# Patient Record
Sex: FEMALE | Race: ASIAN | URBAN - METROPOLITAN AREA
[De-identification: names, ages, dates, MRNs, and addresses within clinical notes are randomized per-mention and may not be internally consistent; named-entity substitution may affect disease eponyms.]

---

## 2023-02-06 ENCOUNTER — HOSPITAL ENCOUNTER (OUTPATIENT)
Dept: PHYSICAL THERAPY | Age: 63
Setting detail: THERAPIES SERIES
Discharge: HOME | End: 2023-02-06
Payer: COMMERCIAL

## 2023-02-06 PROCEDURE — 97530 THERAPEUTIC ACTIVITIES: CPT

## 2023-02-06 PROCEDURE — 97162 PT EVAL MOD COMPLEX 30 MIN: CPT

## 2023-02-06 NOTE — FLOWSHEET NOTE
Jose Antonio  78. and Therapy, [facility], [address], [address]  Phone: [phone]  Fax [phone]     Physical Therapy: Daily Note   Patient: Eron Goldman (51 y.o. female)   Treatment Date: 2023   :  1960 MRN: [id number]   Visit #: 1   Auth needed? []  Yes    [x]  No   Amount authorized:   Visit Limit:  Insurance: Payor: Bacilio Ernandez / Plan: [facility] / Product Type: *No Product type* /   Insurance ID: [id number] - (Commercial)  Secondary Insurance (if applicable):    Treatment Diagnosis:    No diagnosis found. Medical Diagnosis: Sciatic nerve pain [M54.30]     Referring Physician: Tato Marr MD    PCP: No primary care provider on file. Plan of Care signed? []  Yes [x]  No  Latex Allergy? [] Y   [] N      Pacemaker? [] Y   [] N   Preferred Language for Healthcare:   [x] English       [] other:       RESTRICTIONS/PRECAUTIONS:   osteoporosis   recent surgical history (relative)    Functional Outcome Measure/Scale:    Date Assessed (Eval)     Modified Oswestry  (%) 44% disability          SUBJECTIVE EXAMINATION   Pain level:  1-2/10    Patient Report/Comments: Pt presents with c/o sciatic nerve pain for 5+ years, Off/on.  was 'so bad' that she had surgery 10/20/21 to 'clean out for the nerve'. Felt good for a short while after surgery but pain has returned and worsened. Morning is the least pain. Pain is worse during the day and worsens as the day goes. Pt able to exercise in the morning without pain. Hot yoga has 'helped a lot'. Pain is in the L buttock into the L posterior thigh almost to the knee. Pain is sharp. Standing is the most painful. Sitting can improve the pain but not get rid of it. Sleeping will 'get rid of the pain' but difficulty falling asleep due to pain. Pt notes long Hx of L knee pain with up/down steps. No limitations with sitting.   Standing limited to 5 mins due to pain. Walking limited to 5 mins when the pain is aggravated. In the morning walking 6 miles without issues. Sleeping through the night. Pt not currently taking medication for the pain. Pt had a nerve block 2 weeks ago but the pain did not improve. OBJECTIVE EXAMINATION   Observation:     Test measurements:     Standing:  elevated L ilium, R pelvic rotation. R toeing out with increased pes planus. Increased R WB, slight L knee flexion. L knee valgus and pes planus. Decreased gluteal and core activation with standing posture, sway back with forward pelvis. B scapular IR, AT, R worse than L. Palpation: tenderness and fullness in L QL     Gait/Steps/Balance    []   [facility]        [x]    Dysfunction Noted. Comment: Decreased stride length and push off, femoral amb, antalgic gait with waddle gait. ERS lumbar spine. Hip IR and knee valgus, L worse than R. Decreased stance time on L.                []  All balance WFL unless otherwise noted below:  Single limb stance: L with forward pelvis, hip IR and trendelenberg. SLS squat:  B hip IR, knee valgus, trendelenberg, all worse on L SLS  Squat: Quad dominant, increased R WB     Joint Mobility  Lumbar: Hypomobility L SI  Hip: Hypomobility L hip joint         TREATMENT     Exercise / Equipment / Intervention Sets / Reps / Resistance Comments / Cueing HEP         TA sets  NV     Bridging NV      Clamshells NV                                                      TG  NV                          Manual Treatment: NV B LE LAD, LE pulling. B hip IR/ER neuro re-ed resisted training. Sciatic tracing if necessary. S3 mobs gr 4 through 3 breaths. Modalities:    [x] None  [] Electrical Stimulation: for pain modulation and symptom control  [] Other:     ASSESSMENT     Pt is a 59 y/o presenting to physical therapy with c/o L hip pain. Movement impairment L AGMR and ERS lumbar spine. Weakness B gluteals.      Treatment/Activity Tolerance:  [x] Patient tolerated treatment well [] Patient limited by fatigue  [] Patient limited by pain  [] Patient limited by other medical complications  [] Other:     Overall Progression Towards Functional goals/ Treatment Progress Update:  [] Patient is progressing as expected towards functional goals listed. [] Progression is slowed due to complexities/Impairments listed. [] Progression has been slowed due to co-morbidities. [x] Plan just implemented, too soon (<30days) to assess goals progression   [] Goals require adjustment due to lack of progress  [] Patient is not progressing as expected and requires additional follow up with physician  [] Other:     Prognosis for POC:   [x] Good     [] Fair     [] Poor      Patient requires continued skilled intervention: [x] Yes       [] No    GOALS     Short term goal 1: Pt will be indep in HEP  Short term goal 2: Pt will decrease pain 25%  Short term goal 3: Pt will increase B hip strength to 4/5  Short term goal 4: Pt will return to PLOF  Short term goal 5: Pt will return to up and down steps without limits to pain with reciprocal gait     TREATMENT PLAN     [] Continue per plan of care [] Alter current plan  [x] Plan of care initiated [] Hold pending MD visit  [] Discharge    BILLING     Timed Code Treatment Minutes: 15   Total Treatment Minutes: 45       CPT Code # CPT Code #     [] Eval Low (370 1011)    [] Gait [phone])     [x] Eval Medium (88233) 1  [] VASO (37344)     [] Eval High (21851)   [] Estim Unattended (51163)     [] Re-Eval (59611)   [] Mech. Traction (21639)     [] Therex (64630)    [] Dry Needle 1-2 muscle (71378)     [] Neuromusc. Re-ed (10980)   [] Dry Needle 3+ muscle (57550)     [] Manual (66016)   [] Group Therapy     [x] Ther.  Act (79598) 1  []       Electronically Signed by Portia Cortez, PT  Date: 02/06/2023

## 2023-02-06 NOTE — THERAPY EVALUATION
Jose Antonio  78. and Therapy, [place] [address] [address]  [address] [facility], [address] [place] [address] Dr  Phone: [phone]  Fax [phone]    Dear Referring Practitioner: Dr. Nkechi Sheppard,     We had the pleasure of evaluating the following patient for physical therapy services at [facility]. A summary of our findings can be found in the initial assessment below. This includes our plan of care. If you have any questions or concerns regarding these findings, please do not hesitate to contact me at the office phone number. Thank you for the referral.         Physician Signature:_______________________________Date:__________________  By signing above (or electronic signature), therapists plan is approved by physician        LOWER EXTREMITY PHYSICAL THERAPY EVALUATION      Evaluation Date: 2/6/2023    Patient Name: Thania Levine   YOB: 1960    Medical Diagnosis:  Sciatic nerve pain [M54.30]  Treatment Diagnosis:  Sciatic Pain  Onset Date:  Chronic    Referral Date: 2/6/2023   Referring Provider: Jelani Hendrickson MD   Insurance Provider: [facility]  Restrictions/Precautions:    osteoporosis   recent surgical history (relative)    SUBJECTIVE FINDINGS    History of Present Illness:  Pt presents with c/o sciatic nerve pain for 5+ years, Off/on. 2021 was 'so bad' that she had surgery 10/20/21 to 'clean out for the nerve'. Felt good for a short while after surgery but pain has returned and worsened. Morning is the least pain. Pain is worse during the day and worsens as the day goes. Pt able to exercise in the morning without pain. Hot yoga has 'helped a lot'. Pain is in the L buttock into the L posterior thigh almost to the knee. Pain is sharp. Standing is the most painful. Sitting can improve the pain but not get rid of it. Sleeping will 'get rid of the pain' but difficulty falling asleep due to pain. Pt notes long Hx of L knee pain with up/down steps. No limitations with sitting. Standing limited to 5 mins due to pain. Walking limited to 5 mins when the pain is aggravated. In the morning walking 6 miles without issues. Sleeping through the night. Pt not currently taking medication for the pain. Pt had a nerve block 2 weeks ago but the pain did not improve. Medical History: See above   Current Functional Limitations: See above    PLOF: Full functional activity without limits to pain. Currently, 50% PLOF    Red Flags:  none    Pain       Patient describes pain to be intermittent   Patient reports 1-2/10 pain at present and  6/10 pain at its worst.  Worsened by standing, walking if flared up  Improved by rest, sleep  Pt. reports pain with coughing, sneezing and laughing:   []Yes   [x]No   []NA   Pt. reports bowel and bladder changes (incontinence, retention):   []Yes   [x]No   []NA   Pt. reports saddle paresthesia? []Yes   [x]No   []NA   Pt. reports that the knee/hip/ankle gives out, locks, pops, grinds     []Yes [x]   No    Pt's sleep is affected? []   Yes [x]   No  []   N/A      OBJECTIVE FINDINGS    Imaging Results: None recently    Palpation/Tenderness/Visual Inspection       Standing:  elevated L ilium, R pelvic rotation. R toeing out with increased pes planus. Increased R WB, slight L knee flexion. L knee valgus and pes planus. Decreased gluteal and core activation with standing posture, sway back with forward pelvis. B scapular IR, AT, R worse than L. Palpation: tenderness and fullness in L QL      Gait/Steps/Balance    []   [facility] [x]    Dysfunction Noted. Comment: Decreased stride length and push off, femoral amb, antalgic gait with waddle gait. ERS lumbar spine. Hip IR and knee valgus, L worse than R. Decreased stance time on L.      []  All balance WFL unless otherwise noted below:  Single limb stance: L with forward pelvis, hip IR and trendelenberg.    SLS squat:  B hip IR, knee valgus, trendelenberg, all worse on L SLS  Squat: Quad dominant, increased R WB    Lumbar Range of Motion/Strength Testing      [x] All WFL except as marked below  ROM (*denotes pain) AROM PROM COMMENTS   Flexion WNL     Extension WNL     Sidebending Left WNL     Sidebending Right WNL     Rotation Left  WNL     Rotation Right WNL   Pain in the L buttock     Pelvis/Sacral Assessment  Special Test Findings Comments   Standing   Terri' Sign []Neg   []Pos R   []Pos L   []NT    Iliac Crest []Level  []High R   []High L       ASIS []Level  []High R   []High L     PSIS []Level  []High R   []High L     Forward Flexion []Neg   []Pos R   []Pos L   []NT    Sacral Sulci []Even []Prominent R   []Prominent L     Supine   Leg Length []Level  []Long R   []Long L  []Sx on R [x]Sx on L     ASIS []Level  []High R   []High L     PSIS []Level  []High R   []High L     Long Sit Test []Neg   []Pos R   []Pos L   []NT    Prone   PSIS []Level  [x]High R   []High L     Sacral base []Even []Prominent R   []Prominent L    Sacral CHILANGO []Even []Prominent R   []Prominent L    Flick sign []Neg   []Pos R   [x]Pos L   []NT        Other Special Tests Lumbar Spine and Hip  Special Test Findings   Standing:    Lumbar reposition [x]Neg   []Pos   Seated:    Slump Test/Dural Tension []Neg   []Pos R   []Pos L   [x]NT       Supine:    90/90 test  []Neg   []Pos R   []Pos L   []NT   Gaenslen's test []Neg   []Pos R   []Pos L   []NT   Straight Leg Raise []Neg   []Pos R   []Pos L   []NT   Lumbar Distraction  []Relief noted   []No relief noted  []Rebound pain   []NT   Hip scour []Neg   []Pos R   []Pos L   []NT   Munira's test []Neg   []Pos R   []Pos L   []NT   Triston's test []Neg   []Pos R   []Pos L   []NT   Oscillation []Neg   []Pos R   []Pos L   []NT   Femoral nerve tension test []Neg   []Pos R   []Pos L   []NT     Sensation/Motor Function   [x] All dermatomes WNL except as marked below   [x] All  myotomes WNL except as marked below      Dermatome Left Right   Anterior groin, 2-3 inches below ASIS (L1-L2)     Middle third anterior thigh (L3)     Patella and med malleolus (L4)     Fibular head and dorsum of foot (L5)     Lateral side and plantar surface of foot (S1)     Medial aspect of posterior thigh (S2)     Perianal area (S3,4)            Range of Motion/Strength Testing     [x] All ROM and strength WNL except as marked below   * Denotes limitation by pain    Range Tested AROM PROM MMT/Resisted    Left Right Left Right Left Right   Hip Flexion         Hip Extension         Hip Abduction         Hip Adduction         Hip IR WNL 10   4-/5 4/5   Hip ER 15 20   4-/5 4-/5   Knee Flexion         Knee Extension         Ankle Dorsiflex         Ankle Plantarflex         Ankle Inversion         Ankle Eversion           Flexibility     [x] All flexibility WNL except as marked below  Muscle Left Right   Hamstrings (90/90) []  WNL  [x] Tight  [] NT []  WNL  [] Tight  [] NT   Gastroc []  WNL  [] Tight  [] NT []  WNL  [] Tight  [] NT   TFL/ITB (Shelias) []  WNL  [] Tight  [] NT []  WNL  [] Tight  [] NT   Iliopsoas (Willma Luz) []  WNL  [x] Tight  [] NT []  WNL  [x] Tight  [] NT   Piriformis []  WNL  [] Tight  [] NT []  WNL  [] Tight  [] NT     Joint Mobility  Lumbar: Hypomobility L SI  Hip: Hypomobility L hip joint  Knee: Ankle/Foot:    Reflexes/Trunk Strength    [x] All WNL except as marked below     Reflex Left Right Strength Strength   Quadriceps (L3,4)   Transv Abdominis    Achilles (S1,2)       Ankle clonus       Babinski         ASSESSMENT  Pt is a 59 y/o presenting to physical therapy with c/o L hip pain. Movement impairment L AGMR and ERS lumbar spine. Weakness B gluteals.       Body Structures, Functions, Activity Limitations Requiring Skilled Therapeutic Intervention: Decreased functional mobility ,Decreased ADL status,Decreased ROM,Decreased body mechanics,Decreased strength,Decreased balance,Increased pain     Statement of Medical Necessity: Physical Therapy is both indicated and medically necessary as outlined in the POC to increase the likelihood of meeting the functionally related goals stated below. Eval Complexity:    Decision Making: Medium Complexity    PLAN OF CARE    Frequency: 2x/wk for 6 weeks  Current Treatment Recommendations: Therapeutic exercise, therapeutic activity, manual therapy, gait training, neuromuscular re-education    FUNCTIONAL OUTCOME MEASURE  Modified Oswestry  44% disability     GOALS  Short term goal 1: Pt will be indep in HEP  Short term goal 2: Pt will decrease pain 25%  Short term goal 3: Pt will increase B hip strength to 4/5  Short term goal 4: Pt will return to PLOF  Short term goal 5: Pt will return to up and down steps without limits to pain with reciprocal gait    Thank you for the referral of this patient.      Time In:  7:45  Time Out: 8:30  Timed Code Treatment Minutes:  15  minutes            Total Treatment Time:  45 minutes      Misti Louis PT license #[id number]

## 2023-02-08 ENCOUNTER — HOSPITAL ENCOUNTER (OUTPATIENT)
Dept: PHYSICAL THERAPY | Age: 63
Setting detail: THERAPIES SERIES
Discharge: HOME | End: 2023-02-08
Payer: COMMERCIAL

## 2023-02-08 PROCEDURE — 97110 THERAPEUTIC EXERCISES: CPT

## 2023-02-08 PROCEDURE — 97112 NEUROMUSCULAR REEDUCATION: CPT

## 2023-02-08 PROCEDURE — 97140 MANUAL THERAPY 1/> REGIONS: CPT

## 2023-02-08 NOTE — FLOWSHEET NOTE
[facility], [facility], [address] [place] [address]   Phone: [phone]  Fax [phone]     Physical Therapy: Daily Note   Patient: Erica Livingston (41 y.o. female)   Treatment Date: 2023   :  1960 MRN: [id number]   Visit #: 2   Auth needed? []  Yes    [x]  No   Amount authorized:   Visit Limit:  Insurance: Payor: Rufina Marr / Plan: [employer] / Product Type: *No Product type* /   Insurance ID: [id number] - (Commercial)  Secondary Insurance (if applicable):    Treatment Diagnosis:    No diagnosis found. Medical Diagnosis: Sciatic nerve pain [M54.30]     Referring Physician: Chano Valladares MD    PCP: No primary care provider on file. Plan of Care signed? []  Yes [x]  No  Latex Allergy? [] Y   [] N      Pacemaker? [] Y   [] N   Preferred Language for Healthcare:   [x] English       [] other:       RESTRICTIONS/PRECAUTIONS:   osteoporosis   recent surgical history (relative)    Functional Outcome Measure/Scale:    Date Assessed (Eval)     Modified Oswestry  (%) 44% disability          SUBJECTIVE EXAMINATION   Pain level:  4/10 L gluteal and radicular symptoms to the posterior knee. Patient Report/Comments:  Pt notes increased 'tired' today with increased activity. At initial evaluation:  Pt presents with c/o sciatic nerve pain for 5+ years, Off/on.  was 'so bad' that she had surgery 10/20/21 to 'clean out for the nerve'. Felt good for a short while after surgery but pain has returned and worsened. Morning is the least pain. Pain is worse during the day and worsens as the day goes. Pt able to exercise in the morning without pain. Hot yoga has 'helped a lot'. Pain is in the L buttock into the L posterior thigh almost to the knee. Pain is sharp. Standing is the most painful. Sitting can improve the pain but not get rid of it.   Sleeping will 'get rid of the pain' but difficulty falling asleep due to pain. Pt notes long Hx of L knee pain with up/down steps. No limitations with sitting. Standing limited to 5 mins due to pain. Walking limited to 5 mins when the pain is aggravated. In the morning walking 6 miles without issues. Sleeping through the night. Pt not currently taking medication for the pain. Pt had a nerve block 2 weeks ago but the pain did not improve. OBJECTIVE EXAMINATION   Observation:     Test measurements:     Standing:  elevated L ilium, R pelvic rotation. R toeing out with increased pes planus. Increased R WB, slight L knee flexion. L knee valgus and pes planus. Decreased gluteal and core activation with standing posture, sway back with forward pelvis. B scapular IR, AT, R worse than L. Palpation: tenderness and fullness in L QL     Gait/Steps/Balance    []   [facility]        [x]    Dysfunction Noted. Comment: Decreased stride length and push off, femoral amb, antalgic gait with waddle gait. ERS lumbar spine. Hip IR and knee valgus, L worse than R. Decreased stance time on L.                []  All balance WFL unless otherwise noted below:  Single limb stance: L with forward pelvis, hip IR and trendelenberg. SLS squat:  B hip IR, knee valgus, trendelenberg, all worse on L SLS  Squat: Quad dominant, increased R WB     Joint Mobility  Lumbar: Hypomobility L SI  Hip: Hypomobility L hip joint         TREATMENT     Exercise / Equipment / Intervention Sets / Linda Hora / Resistance Comments / Cueing HEP         TA sets with BP cuff        Marching  Until achieved   x10  x   Bridging 10x5 secs  x   Clamshells NV            DKTC with feet on TB  LTR with feet on TB  x20  x20                 B hip IR/ER neuro re-ed x5 mins                       TG  x6 mins                         Manual Treatment: B LE LAD, LE pulling. B hip IR/ER neuro re-ed resisted training. Lumbar gapping and SB mobs gr 3-4 L side. QL STM followed by manual stretching and needing.   STM L gluteal.  Sciatic tracing with and without flossing, gluteal to distal hamstring. L hamstring muscle bending. S3 mobs gr 4 through 3 breaths. L hip hit technique. Modalities:    [x] None  [] Electrical Stimulation: for pain modulation and symptom control  [] Other:     ASSESSMENT     Pt is a 59 y/o presenting to physical therapy with c/o L hip pain. Movement impairment L AGMR and ERS lumbar spine. Weakness B gluteals. Treatment/Activity Tolerance:  [x] Patient tolerated treatment well [] Patient limited by fatigue  [] Patient limited by pain  [] Patient limited by other medical complications  [] Other:     Overall Progression Towards Functional goals/ Treatment Progress Update:  [] Patient is progressing as expected towards functional goals listed. [] Progression is slowed due to complexities/Impairments listed. [] Progression has been slowed due to co-morbidities.   [x] Plan just implemented, too soon (<30days) to assess goals progression   [] Goals require adjustment due to lack of progress  [] Patient is not progressing as expected and requires additional follow up with physician  [] Other:     Prognosis for POC:   [x] Good     [] Fair     [] Poor      Patient requires continued skilled intervention: [x] Yes       [] No    GOALS     Short term goal 1: Pt will be indep in HEP  Short term goal 2: Pt will decrease pain 25%  Short term goal 3: Pt will increase B hip strength to 4/5  Short term goal 4: Pt will return to PLOF  Short term goal 5: Pt will return to up and down steps without limits to pain with reciprocal gait     TREATMENT PLAN     [x] Continue per plan of care [] Alter current plan  [] Plan of care initiated [] Hold pending MD visit  [] Discharge    BILLING     Timed Code Treatment Minutes: 45   Total Treatment Minutes: 45       CPT Code # CPT Code #     [] Eval Low (25151)    [] Gait (08373)     [] Eval Medium (44415)   [] VASO (17835)     [] Eval High (25963)   [] Estim Unattended (19717)     [] Re-Eval (49903)   [] Sadie Cornejo. Traction (06244)     [x] Therex (82944)  1  [] Dry Needle 1-2 muscle (53437)     [x] Neuromusc. Re-ed (08844) 1  [] Dry Needle 3+ muscle (35378)     [x] Manual (20673) 1  [] Group Therapy     [] Ther.  Act (82677)   []       Electronically Signed by Zackary Gong, PT  Date: 02/08/2023

## 2023-02-10 ENCOUNTER — HOSPITAL ENCOUNTER (OUTPATIENT)
Dept: PHYSICAL THERAPY | Age: 63
Setting detail: THERAPIES SERIES
Discharge: HOME | End: 2023-02-10
Payer: COMMERCIAL

## 2023-02-10 PROCEDURE — 97112 NEUROMUSCULAR REEDUCATION: CPT

## 2023-02-10 PROCEDURE — 97140 MANUAL THERAPY 1/> REGIONS: CPT

## 2023-02-10 PROCEDURE — 97110 THERAPEUTIC EXERCISES: CPT

## 2023-02-10 NOTE — FLOWSHEET NOTE
Jose Antonio  78. and Therapy, [facility], [address] [place], [address]  Phone: [phone]  Fax [phone]     Physical Therapy: Daily Note   Patient: Dajuan Alcaraz (65 y.o. female)   Treatment Date: 02/10/2023   :  1960 MRN: [id number]   Visit #: 3   Auth needed? []  Yes    [x]  No   Amount authorized:   Visit Limit:  Insurance: Payor: Radha Noble / Plan: [facility] / Product Type: *No Product type* /   Insurance ID: [id number] - (Commercial)  Secondary Insurance (if applicable):    Treatment Diagnosis:    No diagnosis found. Medical Diagnosis: Sciatic nerve pain [M54.30]     Referring Physician: Marjorie Quiroz MD    PCP: No primary care provider on file. Plan of Care signed? []  Yes [x]  No  Latex Allergy? [] Y   [] N      Pacemaker? [] Y   [] N   Preferred Language for Healthcare:   [x] English       [] other:       RESTRICTIONS/PRECAUTIONS:   osteoporosis   recent surgical history (relative)    Functional Outcome Measure/Scale:    Date Assessed (Eval)     Modified Oswestry  (%) 44% disability          SUBJECTIVE EXAMINATION   Pain level:  4/10 L gluteal and radicular symptoms to the posterior knee. Patient Report/Comments:  Pt notes increased 'tired' today with increased activity. At initial evaluation:  Pt presents with c/o sciatic nerve pain for 5+ years, Off/on.  was 'so bad' that she had surgery 10/20/21 to 'clean out for the nerve'. Felt good for a short while after surgery but pain has returned and worsened. Morning is the least pain. Pain is worse during the day and worsens as the day goes. Pt able to exercise in the morning without pain. Hot yoga has 'helped a lot'. Pain is in the L buttock into the L posterior thigh almost to the knee. Pain is sharp. Standing is the most painful. Sitting can improve the pain but not get rid of it.   Sleeping will 'get rid of the pain' but difficulty falling asleep due to pain. Pt notes long Hx of L knee pain with up/down steps. No limitations with sitting. Standing limited to 5 mins due to pain. Walking limited to 5 mins when the pain is aggravated. In the morning walking 6 miles without issues. Sleeping through the night. Pt not currently taking medication for the pain. Pt had a nerve block 2 weeks ago but the pain did not improve. OBJECTIVE EXAMINATION   Observation:     Test measurements:     Standing:  elevated L ilium, R pelvic rotation. R toeing out with increased pes planus. Increased R WB, slight L knee flexion. L knee valgus and pes planus. Decreased gluteal and core activation with standing posture, sway back with forward pelvis. B scapular IR, AT, R worse than L. Palpation: tenderness and fullness in L QL     Gait/Steps/Balance    []   [facility]        [x]    Dysfunction Noted. Comment: Decreased stride length and push off, femoral amb, antalgic gait with waddle gait. ERS lumbar spine. Hip IR and knee valgus, L worse than R. Decreased stance time on L.                []  All balance WFL unless otherwise noted below:  Single limb stance: L with forward pelvis, hip IR and trendelenberg. SLS squat:  B hip IR, knee valgus, trendelenberg, all worse on L SLS  Squat: Quad dominant, increased R WB     Joint Mobility  Lumbar: Hypomobility L SI  Hip: Hypomobility L hip joint         TREATMENT     Exercise / Equipment / Intervention Sets / Tonye Eves / Resistance Comments / Cueing HEP         TA sets with BP cuff        Marching  NV   x   Bridging 10x5 secs  x   Clamshells NV      Supine sciatic flossing x10B  x   DKTC with feet on TB  LTR with feet on TB  x20  x20     Ball compression       Diagonally  10x10 secs   10x5 secs     Lumbo-pelvic stabilization x4 mins     B hip IR/ER neuro re-ed x5 mins                       TG  x6 mins                         Manual Treatment:  B LE LAD, LE pulling.   B hip IR/ER neuro re-ed resisted training. Lumbar gapping and SB mobs gr 3-4 L side. QL STM followed by manual stretching and needing. .  General STM L gluteal and L hamstring muscle bending. S3 mobs gr 4 through 3 breaths. L sacral SB mob correction through 3 breaths gr 4. L hip hit technique. Modalities:    [x] None  [] Electrical Stimulation: for pain modulation and symptom control  [] Other:     ASSESSMENT     Pt is a 59 y/o presenting to physical therapy with c/o L hip pain. Movement impairment L AGMR and ERS lumbar spine. Weakness B gluteals. Pt c/o sciatic symptoms after last session for 1-2 days. Held neural tracing and flossing to avoid aggravating acute neural symptoms. Hypomobility SI and L hip joints today that improved with manual therapy. Continue to progress lumbo-pelvic stabilization as tolerated. Treatment/Activity Tolerance:  [x] Patient tolerated treatment well [] Patient limited by fatigue  [] Patient limited by pain  [] Patient limited by other medical complications  [] Other:     Overall Progression Towards Functional goals/ Treatment Progress Update:  [] Patient is progressing as expected towards functional goals listed. [] Progression is slowed due to complexities/Impairments listed. [] Progression has been slowed due to co-morbidities.   [x] Plan just implemented, too soon (<30days) to assess goals progression   [] Goals require adjustment due to lack of progress  [] Patient is not progressing as expected and requires additional follow up with physician  [] Other:     Prognosis for POC:   [x] Good     [] Fair     [] Poor      Patient requires continued skilled intervention: [x] Yes       [] No    GOALS     Short term goal 1: Pt will be indep in HEP  Short term goal 2: Pt will decrease pain 25%  Short term goal 3: Pt will increase B hip strength to 4/5  Short term goal 4: Pt will return to PLOF  Short term goal 5: Pt will return to up and down steps without limits to pain with reciprocal gait TREATMENT PLAN     [x] Continue per plan of care [] Alter current plan  [] Plan of care initiated [] Hold pending MD visit  [] Discharge    BILLING     Timed Code Treatment Minutes: 45   Total Treatment Minutes: 45       CPT Code # CPT Code #     [] Eval Low (99043)    [] Gait (74497)     [] Eval Medium (91599)   [] VASO (21826)     [] Eval High (20145)   [] Estim Unattended (35005)     [] Re-Eval (27056)   [] [facility]. Traction (63554)     [x] Therex (09576)  1  [] Dry Needle 1-2 muscle (90713)     [x] Neuromusc. Re-ed (40020) 1  [] Dry Needle 3+ muscle (93353)     [x] Manual (77272) 1  [] Group Therapy     [] Ther.  Act (37838)   []       Electronically Signed by Uzair Dior PT  Date: 02/10/2023

## 2023-02-21 ENCOUNTER — HOSPITAL ENCOUNTER (OUTPATIENT)
Dept: PHYSICAL THERAPY | Age: 63
Setting detail: THERAPIES SERIES
Discharge: HOME | End: 2023-02-21
Payer: COMMERCIAL

## 2023-02-21 NOTE — PROGRESS NOTES
Physical Therapy  Cancellation/No-show Note  Patient Name:  Swapna Lopez  :  1960   Date:  2023  Cancels to date: 1  No-shows to date: 0    For today's appointment patient:  [x] Cancelled  [] Rescheduled appointment  [] No-show     Reason given by patient:  [] Patient ill  [] Conflicting appointment  [] No transportation    [] Conflict with work  [] No reason given  [] Other:     Comments:      Electronically signed by:  Meggan Varela PT

## 2023-02-23 ENCOUNTER — HOSPITAL ENCOUNTER (OUTPATIENT)
Dept: PHYSICAL THERAPY | Age: 63
Setting detail: THERAPIES SERIES
Discharge: HOME | End: 2023-02-23
Payer: COMMERCIAL

## 2023-02-23 PROCEDURE — 97110 THERAPEUTIC EXERCISES: CPT

## 2023-02-23 PROCEDURE — 97140 MANUAL THERAPY 1/> REGIONS: CPT

## 2023-02-23 PROCEDURE — 97112 NEUROMUSCULAR REEDUCATION: CPT

## 2023-02-23 NOTE — FLOWSHEET NOTE
[facility], [facility], [address]  Phone: [phone]  Fax [phone]     Physical Therapy: Daily Note   Patient: Kleber Marcano (47 y.o. female)   Treatment Date: 2023   :  1960 MRN: [id number]   Visit #: 4   Auth needed? []  Yes    [x]  No   Amount authorized:   Visit Limit:  Insurance: Payor: Nubia Donaldson / Plan: [facility] / Product Type: *No Product type* /   Insurance ID: [id number] - (Commercial)  Secondary Insurance (if applicable):    Treatment Diagnosis:    No diagnosis found. Medical Diagnosis: Sciatic nerve pain [M54.30]     Referring Physician: Sharita Knight MD    PCP: No primary care provider on file. Plan of Care signed? []  Yes [x]  No  Latex Allergy? [] Y   [] N      Pacemaker? [] Y   [] N   Preferred Language for Healthcare:   [x] English       [] other:       RESTRICTIONS/PRECAUTIONS:   osteoporosis   recent surgical history (relative)    Functional Outcome Measure/Scale:    Date Assessed (Eval)     Modified Oswestry  (%) 44% disability          SUBJECTIVE EXAMINATION   Pain level:  4/10 L gluteal and radicular symptoms to the posterior knee. Patient Report/Comments:  Pt has been on vacation for the past 10 days and had quite a bit of pain in the L hip. Pt had x-ray of the hip/pelvis which was (-) for fx. Pt having MRI as well. During vacation standing and sitting both aggravated the pain. Standing was the worst pain. Pt had a steroid but it did not improve symptoms. Pain increases as the day progresses. OBJECTIVE EXAMINATION   Observation:     Test measurements:     Standing:  elevated L ilium, R pelvic rotation. R toeing out with increased pes planus. Increased R WB, slight L knee flexion. L knee valgus and pes planus. Decreased gluteal and core activation with standing posture, sway back with forward pelvis.   B scapular IR, AT, R worse than L. Palpation: tenderness and fullness in L QL     Gait/Steps/Balance    []   [facility]        [x]    Dysfunction Noted. Comment: Decreased stride length and push off, femoral amb, antalgic gait with waddle gait. ERS lumbar spine. Hip IR and knee valgus, L worse than R. Decreased stance time on L.                []  All balance WFL unless otherwise noted below:  Single limb stance: L with forward pelvis, hip IR and trendelenberg. SLS squat:  B hip IR, knee valgus, trendelenberg, all worse on L SLS  Squat: Quad dominant, increased R WB     Joint Mobility  Lumbar: Hypomobility L SI  Hip: Hypomobility L hip joint         TREATMENT     Exercise / Equipment / Intervention Sets / Jensen Bustle / Resistance Comments / Cueing HEP         TA sets with BP cuff        Marching  NV   x   Bridging 10x5 secs without lift  x   Clamshells  Red TB alternating    Supine sciatic flossing x10B  x   DKTC with feet on TB  LTR with feet on TB  x20  x20     Ball compression       Diagonally  10x10 secs   10x5 secs     Lumbo-pelvic stabilization x4 mins     B hip IR/ER neuro re-ed      S' extension 10x5 secs  Maroon TB     Multifidus  x15 Maroon TB           TG  x6 mins                         Manual Treatment:   QL STM followed by manual stretching and needing. STM L gluteal.  Sciatic tracing with and without flossing, gluteal to distal hamstring. General STM L gluteal and L hamstring muscle bending. Modalities:    [x] None  [] Electrical Stimulation: for pain modulation and symptom control  [] Other:     ASSESSMENT     Pt is a 59 y/o presenting to physical therapy with c/o L hip pain. Movement impairment L AGMR and ERS lumbar spine. Weakness B gluteals. Pt c/o sciatic symptoms after last session for 1-2 days. Pain the past couple of days following a L3 referral pattern.       Treatment/Activity Tolerance:  [x] Patient tolerated treatment well [] Patient limited by fatigue  [] Patient limited by pain  [] Patient limited by other medical complications  [] Other:     Overall Progression Towards Functional goals/ Treatment Progress Update:  [] Patient is progressing as expected towards functional goals listed. [x] Progression is slowed due to complexities/Impairments listed. [] Progression has been slowed due to co-morbidities. [] Plan just implemented, too soon (<30days) to assess goals progression   [] Goals require adjustment due to lack of progress  [] Patient is not progressing as expected and requires additional follow up with physician  [] Other:     Prognosis for POC:   [x] Good     [] Fair     [] Poor      Patient requires continued skilled intervention: [x] Yes       [] No    GOALS     Short term goal 1: Pt will be indep in HEP  Short term goal 2: Pt will decrease pain 25%  Short term goal 3: Pt will increase B hip strength to 4/5  Short term goal 4: Pt will return to PLOF  Short term goal 5: Pt will return to up and down steps without limits to pain with reciprocal gait     TREATMENT PLAN     [x] Continue per plan of care [] Alter current plan  [] Plan of care initiated [] Hold pending MD visit  [] Discharge    BILLING     Timed Code Treatment Minutes: 45   Total Treatment Minutes: 45       CPT Code # CPT Code #     [] Eval Low ([phone])    [] Gait [phone])     [] Eval Medium (11102)   [] VASO (98943)     [] Eval High (44085)   [] Estim Unattended (68613)     [] Re-Eval (71417)   [] [facility]. Traction (75235)     [x] Therex (04068)  1  [] Dry Needle 1-2 muscle (48747)     [x] Neuromusc. Re-ed (40996) 1  [] Dry Needle 3+ muscle (40198)     [x] Manual (47230) 1  [] Group Therapy     [] Ther.  Act (92983)   []       Electronically Signed by Lowell Last, PT  Date: 02/23/2023

## 2023-02-27 ENCOUNTER — HOSPITAL ENCOUNTER (OUTPATIENT)
Dept: PHYSICAL THERAPY | Age: 63
Setting detail: THERAPIES SERIES
Discharge: HOME | End: 2023-02-27
Payer: COMMERCIAL

## 2023-02-27 NOTE — PROGRESS NOTES
Physical Therapy  Cancellation/No-show Note  Patient Name:  Pk Silva  :  1960   Date:  2023  Cancels to date: 2  No-shows to date: 0    For today's appointment patient:  [x] Cancelled  [] Rescheduled appointment  [] No-show     Reason given by patient:  [] Patient ill  [] Conflicting appointment  [] No transportation    [] Conflict with work  [] No reason given  [] Other:     Comments:      Electronically signed by:  Mala Calles PT

## 2023-03-01 ENCOUNTER — HOSPITAL ENCOUNTER (OUTPATIENT)
Dept: PHYSICAL THERAPY | Age: 63
Setting detail: THERAPIES SERIES
Discharge: HOME | End: 2023-03-01
Payer: COMMERCIAL

## 2023-03-01 PROCEDURE — 97140 MANUAL THERAPY 1/> REGIONS: CPT

## 2023-03-01 PROCEDURE — 97110 THERAPEUTIC EXERCISES: CPT

## 2023-03-01 PROCEDURE — 97112 NEUROMUSCULAR REEDUCATION: CPT

## 2023-03-01 NOTE — FLOWSHEET NOTE
[facility], [facility], [address] [place], [address]  Phone: [phone]  Fax [phone]     Physical Therapy: Daily Note   Patient: Rubin Murphy (78 y.o. female)   Treatment Date: 2023   :  1960 MRN: [id number]   Visit #: 5   Auth needed? []  Yes    [x]  No   Amount authorized:   Visit Limit:  Insurance: Payor: [employer] / Plan: [facility] / Product Type: *No Product type* /   Insurance ID: [id number] - (Commercial)  Secondary Insurance (if applicable):    Treatment Diagnosis:    No diagnosis found. Medical Diagnosis: Sciatic nerve pain [M54.30]     Referring Physician: Rut Can MD    PCP: No primary care provider on file. Plan of Care signed? []  Yes [x]  No  Latex Allergy? [] Y   [] N      Pacemaker? [] Y   [] N   Preferred Language for Healthcare:   [x] English       [] other:       RESTRICTIONS/PRECAUTIONS:   osteoporosis   recent surgical history (relative)    Functional Outcome Measure/Scale:    Date Assessed (Eval)     Modified Oswestry  (%) 44% disability          SUBJECTIVE EXAMINATION   Pain level:  4/10 L gluteal and radicular symptoms to the posterior knee. Patient Report/Comments:  Pt reports no pain after last session through the next day. \"I felt so good\" after last session. Pain returned over the weekend. Pt notes working 'hard' over the weekend. OBJECTIVE EXAMINATION   Observation:     Test measurements:     Standing:  elevated L ilium, R pelvic rotation. R toeing out with increased pes planus. Increased R WB, slight L knee flexion. L knee valgus and pes planus. Decreased gluteal and core activation with standing posture, sway back with forward pelvis. B scapular IR, AT, R worse than L. Palpation: tenderness and fullness in L QL     Gait/Steps/Balance    []   [facility]        [x]    Dysfunction Noted.    Comment: Decreased stride length and push off, femoral amb, antalgic gait with waddle gait. ERS lumbar spine. Hip IR and knee valgus, L worse than R. Decreased stance time on L.                []  All balance WFL unless otherwise noted below:  Single limb stance: L with forward pelvis, hip IR and trendelenberg. SLS squat:  B hip IR, knee valgus, trendelenberg, all worse on L SLS  Squat: Quad dominant, increased R WB     Joint Mobility  Lumbar: Hypomobility L SI  Hip: Hypomobility L hip joint         TREATMENT     Exercise / Equipment / Intervention Sets / Kevin Yennifer / Resistance Comments / Cueing HEP         TA sets with BP cuff        Marching    x   Bridging 10x5 secs without lift  x   Clamshells  Red TB alternating    Supine sciatic flossing x10B  x   DKTC with feet on TB  LTR with feet on TB  x20  x20     Ball compression       Diagonally  10x5 secs   10x5 secs     Lumbo-pelvic stabilization      B hip IR/ER neuro re-ed      S' extension 10x5 secs  Maroon TB     Multifidus  x15 Maroon TB     Rockerboard  x1 min F/B M/L  x1 min rocking each     TG  x6 mins           Hip abd  30# x15B             Manual Treatment:   Prone L SI correction with cavitation. QL STM followed by manual stretching and needing. STM L gluteal.  Sciatic tracing with and without flossing, gluteal to distal hamstring. General STM L gluteal and L hamstring muscle bending. S3 mobs gr 4 through 3 breaths. ASSESSMENT     Pt is a 59 y/o presenting to physical therapy with c/o L hip pain. Movement impairment L AGMR and ERS lumbar spine. Weakness B gluteals. Pt responding well to manual therapy with decreased pain that day and the following day but then pain returns. Continues to demonstrate faulty movement patterns and muscular strength imbalances. Recommend continued PT through current prescription to address faults and return to PLOF.        Treatment/Activity Tolerance:  [x] Patient tolerated treatment well [] Patient limited by fatigue  [] Patient limited by pain  [] Patient limited by other medical complications  [] Other:     Overall Progression Towards Functional goals/ Treatment Progress Update:  [] Patient is progressing as expected towards functional goals listed. [x] Progression is slowed due to complexities/Impairments listed. [] Progression has been slowed due to co-morbidities. [] Plan just implemented, too soon (<30days) to assess goals progression   [] Goals require adjustment due to lack of progress  [] Patient is not progressing as expected and requires additional follow up with physician  [] Other:     Prognosis for POC:   [x] Good     [] Fair     [] Poor      Patient requires continued skilled intervention: [x] Yes       [] No    GOALS     Short term goal 1: Pt will be indep in HEP (met)  Short term goal 2: Pt will decrease pain 25% (improved)  Short term goal 3: Pt will increase B hip strength to 4/5  Short term goal 4: Pt will return to PLOF (not met)  Short term goal 5: Pt will return to up and down steps without limits to pain with reciprocal gait     TREATMENT PLAN     [x] Continue per plan of care [] Alter current plan  [] Plan of care initiated [] Hold pending MD visit  [] Discharge    BILLING     Timed Code Treatment Minutes: 45   Total Treatment Minutes: 45       CPT Code # CPT Code #     [] Eval Low (13026)    [] Gait [phone])     [] Eval Medium (06804)   [] VASO (67214)     [] Eval High (43260)   [] Estim Unattended (15667)     [] Re-Eval (03760)   [] [facility]. Traction (17819)     [x] Therex (80383)  1  [] Dry Needle 1-2 muscle (37135)     [x] Neuromusc. Re-ed (40426) 1  [] Dry Needle 3+ muscle (16609)     [x] Manual (73605) 1  [] Group Therapy     [] Ther.  Act (35932)   []       Electronically Signed by Corey Chappell PT  Date: 03/01/2023

## 2023-03-03 ENCOUNTER — APPOINTMENT (OUTPATIENT)
Dept: PHYSICAL THERAPY | Age: 63
End: 2023-03-03
Payer: COMMERCIAL

## 2023-03-20 LAB — MAMMOGRAPHY, EXTERNAL: NORMAL

## 2023-08-28 ENCOUNTER — HOSPITAL ENCOUNTER (OUTPATIENT)
Facility: HOSPITAL | Age: 63
Discharge: HOME | End: 2023-08-31
Payer: COMMERCIAL

## 2023-08-28 DIAGNOSIS — E87.70 HYPERVOLEMIA, UNSPECIFIED HYPERVOLEMIA TYPE: ICD-10-CM

## 2023-08-28 PROCEDURE — A9579 GAD-BASE MR CONTRAST NOS,1ML: HCPCS | Performed by: RADIOLOGY

## 2023-08-28 PROCEDURE — 6360000004 HC RX CONTRAST MEDICATION: Performed by: RADIOLOGY

## 2023-08-28 PROCEDURE — 72158 MRI LUMBAR SPINE W/O & W/DYE: CPT

## 2023-08-28 RX ADMIN — GADOTERIDOL 14 ML: 279.3 INJECTION, SOLUTION INTRAVENOUS at 17:31

## 2023-09-26 ENCOUNTER — HOSPITAL ENCOUNTER (OUTPATIENT)
Facility: HOSPITAL | Age: 63
Discharge: HOME | End: 2023-09-29
Payer: COMMERCIAL

## 2023-09-26 VITALS
HEIGHT: 60 IN | WEIGHT: 158 LBS | DIASTOLIC BLOOD PRESSURE: 71 MMHG | TEMPERATURE: 97.8 F | SYSTOLIC BLOOD PRESSURE: 116 MMHG | BODY MASS INDEX: 31.02 KG/M2 | HEART RATE: 70 BPM

## 2023-09-26 LAB
ABO + RH BLD: NORMAL
ANION GAP SERPL CALC-SCNC: 1 MMOL/L (ref 5–15)
APTT PPP: 24.6 SEC (ref 22.1–31)
BASOPHILS # BLD: 0.1 K/UL (ref 0–0.1)
BASOPHILS NFR BLD: 1 % (ref 0–1)
BLOOD GROUP ANTIBODIES SERPL: NORMAL
BUN SERPL-MCNC: 16 MG/DL (ref 6–20)
BUN/CREAT SERPL: 27 (ref 12–20)
CALCIUM SERPL-MCNC: 8.9 MG/DL (ref 8.5–10.1)
CHLORIDE SERPL-SCNC: 104 MMOL/L (ref 97–108)
CO2 SERPL-SCNC: 32 MMOL/L (ref 21–32)
CREAT SERPL-MCNC: 0.6 MG/DL (ref 0.55–1.02)
DIFFERENTIAL METHOD BLD: NORMAL
EKG ATRIAL RATE: 64 BPM
EKG DIAGNOSIS: NORMAL
EKG P AXIS: 25 DEGREES
EKG P-R INTERVAL: 160 MS
EKG Q-T INTERVAL: 450 MS
EKG QRS DURATION: 86 MS
EKG QTC CALCULATION (BAZETT): 464 MS
EKG R AXIS: -2 DEGREES
EKG T AXIS: 226 DEGREES
EKG VENTRICULAR RATE: 64 BPM
EOSINOPHIL # BLD: 0.1 K/UL (ref 0–0.4)
EOSINOPHIL NFR BLD: 3 % (ref 0–7)
ERYTHROCYTE [DISTWIDTH] IN BLOOD BY AUTOMATED COUNT: 13.2 % (ref 11.5–14.5)
EST. AVERAGE GLUCOSE BLD GHB EST-MCNC: 108 MG/DL
GLUCOSE SERPL-MCNC: 100 MG/DL (ref 65–100)
HBA1C MFR BLD: 5.4 % (ref 4–5.6)
HCT VFR BLD AUTO: 38.1 % (ref 35–47)
HGB BLD-MCNC: 12.3 G/DL (ref 11.5–16)
IMM GRANULOCYTES # BLD AUTO: 0 K/UL (ref 0–0.04)
IMM GRANULOCYTES NFR BLD AUTO: 0 % (ref 0–0.5)
INR PPP: 1.1 (ref 0.9–1.1)
LYMPHOCYTES # BLD: 1.3 K/UL (ref 0.8–3.5)
LYMPHOCYTES NFR BLD: 35 % (ref 12–49)
MCH RBC QN AUTO: 27.4 PG (ref 26–34)
MCHC RBC AUTO-ENTMCNC: 32.3 G/DL (ref 30–36.5)
MCV RBC AUTO: 84.9 FL (ref 80–99)
MONOCYTES # BLD: 0.3 K/UL (ref 0–1)
MONOCYTES NFR BLD: 9 % (ref 5–13)
NEUTS SEG # BLD: 1.9 K/UL (ref 1.8–8)
NEUTS SEG NFR BLD: 52 % (ref 32–75)
NRBC # BLD: 0 K/UL (ref 0–0.01)
NRBC BLD-RTO: 0 PER 100 WBC
PLATELET # BLD AUTO: 202 K/UL (ref 150–400)
PMV BLD AUTO: 10.4 FL (ref 8.9–12.9)
POTASSIUM SERPL-SCNC: 4.2 MMOL/L (ref 3.5–5.1)
PROTHROMBIN TIME: 11 SEC (ref 9–11.1)
RBC # BLD AUTO: 4.49 M/UL (ref 3.8–5.2)
SODIUM SERPL-SCNC: 137 MMOL/L (ref 136–145)
SPECIMEN EXP DATE BLD: NORMAL
THERAPEUTIC RANGE: NORMAL SECS (ref 58–77)
WBC # BLD AUTO: 3.6 K/UL (ref 3.6–11)

## 2023-09-26 PROCEDURE — 83036 HEMOGLOBIN GLYCOSYLATED A1C: CPT

## 2023-09-26 PROCEDURE — 85025 COMPLETE CBC W/AUTO DIFF WBC: CPT

## 2023-09-26 PROCEDURE — APPNB30 APP NON BILLABLE TIME 0-30 MINS: Performed by: NURSE PRACTITIONER

## 2023-09-26 PROCEDURE — 93005 ELECTROCARDIOGRAM TRACING: CPT | Performed by: NEUROLOGICAL SURGERY

## 2023-09-26 PROCEDURE — 80048 BASIC METABOLIC PNL TOTAL CA: CPT

## 2023-09-26 PROCEDURE — 85730 THROMBOPLASTIN TIME PARTIAL: CPT

## 2023-09-26 PROCEDURE — 85610 PROTHROMBIN TIME: CPT

## 2023-09-26 PROCEDURE — 36415 COLL VENOUS BLD VENIPUNCTURE: CPT

## 2023-09-26 PROCEDURE — 86901 BLOOD TYPING SEROLOGIC RH(D): CPT

## 2023-09-26 PROCEDURE — 86850 RBC ANTIBODY SCREEN: CPT

## 2023-09-26 PROCEDURE — 86900 BLOOD TYPING SEROLOGIC ABO: CPT

## 2023-09-26 RX ORDER — VENLAFAXINE HYDROCHLORIDE 150 MG/1
150 CAPSULE, EXTENDED RELEASE ORAL DAILY
COMMUNITY

## 2023-09-26 ASSESSMENT — PAIN DESCRIPTION - FREQUENCY: FREQUENCY: CONTINUOUS

## 2023-09-26 ASSESSMENT — PAIN DESCRIPTION - ORIENTATION: ORIENTATION: LEFT;POSTERIOR

## 2023-09-26 ASSESSMENT — PAIN DESCRIPTION - LOCATION: LOCATION: KNEE;OTHER (COMMENT)

## 2023-09-26 ASSESSMENT — PAIN DESCRIPTION - PAIN TYPE: TYPE: CHRONIC PAIN

## 2023-09-26 ASSESSMENT — PAIN - FUNCTIONAL ASSESSMENT: PAIN_FUNCTIONAL_ASSESSMENT: PREVENTS OR INTERFERES SOME ACTIVE ACTIVITIES AND ADLS

## 2023-09-26 ASSESSMENT — PAIN DESCRIPTION - ONSET: ONSET: ON-GOING

## 2023-09-26 ASSESSMENT — PAIN SCALES - GENERAL: PAINLEVEL_OUTOF10: 6

## 2023-09-26 ASSESSMENT — PAIN DESCRIPTION - DESCRIPTORS: DESCRIPTORS: SHOOTING

## 2023-09-26 NOTE — PERIOP NOTE
PAT Nutrition Screen    1. Has the patient had an unplanned weight loss of 10% of body weight or more in the last 6 months? No = 0   2. Has the patient been eating less than 50% of their normal diet in the preceding week? No = 0       Patient instructed on Non-Diabetic pre-operative nutrition plan to start 5 days prior to surgery. Patient given 10 shakes and 2 pre-surgery drinks. Opportunity given for questions and all questions answered.

## 2023-09-26 NOTE — PERIOP NOTE
[address] INSTRUCTIONS    Surgery Date:   10/3/2023    Your surgeon's office or [facility] staff will call you between 4 PM- 8 PM the day before surgery with your arrival time. If your surgery is on a Monday, you will receive a call the preceding Friday. If your surgeon;s office has given you arrival time, then go by that time. Please report to [facility] Patient Access/Admitting on the 1st floor. Bring your insurance card, photo identification, and any copayment ( if applicable). If you are going home the same day of your surgery, you must have a responsible adult to drive you home. You need to have a responsible adult to stay with you the first 24 hours after surgery and you should not drive a car for 24 hours following your surgery. If you are being admitted to the hospital, please leave personal belongings/luggage in your car until you have an assigned hospital room number. Nothing to eat or drink after midnight the night before surgery. This includes no water, gum, mints, coffee, juice, etc.  Please note special instructions, if applicable, below for medications. Do NOT drink alcohol or smoke 24 hours before surgery. STOP smoking for 14 days prior as it helps with breathing and healing after surgery. Please wear comfortable clothes. Wear glasses instead of contacts. We ask that all money and jewelry and valuables to be left at home. Wear no makeup, particularly mascara the day of surgery. All body piercings, rings, and jewelry need to be removed and left at home. Remove all nail polish except for clear. Please wear your hair loose or down. Please no pony-tails, buns, or any metal hair accessories. You may wear deodorant, unless having breast surgery. Do not shave any body area within 24 hours of your surgery. Please follow all instructions to avoid any potential surgical cancellation.   Should your physical condition change, (i.e. fever, cold, flu, etc.) please notify your

## 2023-09-27 LAB
BACTERIA SPEC CULT: NORMAL
BACTERIA SPEC CULT: NORMAL
SERVICE CMNT-IMP: NORMAL

## 2023-09-27 NOTE — PROGRESS NOTES
CORONARY ANGIOGRAM 10/01/2021 2:34 PM EDT      Widely patent coronary arteries. CAD-RADS CLASSIFICATION: CAD-RADS 0. No demonstrated plaque or stenosis. CAD-RADS MODIFIERS: No relevant modifier. Skin:     Denies open wounds, cuts, sores, rashes or other areas of concern in PAT assessment.         OSMEL Cam - NP  Available via [employer]

## 2023-10-05 ENCOUNTER — APPOINTMENT (OUTPATIENT)
Facility: HOSPITAL | Age: 63
DRG: 455 | End: 2023-10-05
Attending: NEUROLOGICAL SURGERY
Payer: COMMERCIAL

## 2023-10-05 ENCOUNTER — HOSPITAL ENCOUNTER (INPATIENT)
Facility: HOSPITAL | Age: 63
LOS: 4 days | Discharge: HOME | DRG: 455 | End: 2023-10-09
Attending: NEUROLOGICAL SURGERY | Admitting: NEUROLOGICAL SURGERY
Payer: COMMERCIAL

## 2023-10-05 ENCOUNTER — ANESTHESIA EVENT (OUTPATIENT)
Facility: HOSPITAL | Age: 63
End: 2023-10-05
Payer: COMMERCIAL

## 2023-10-05 ENCOUNTER — ANESTHESIA (OUTPATIENT)
Facility: HOSPITAL | Age: 63
End: 2023-10-05
Payer: COMMERCIAL

## 2023-10-05 DIAGNOSIS — Z98.1 S/P LUMBAR FUSION: Primary | ICD-10-CM

## 2023-10-05 PROBLEM — M43.10 SPONDYLOLISTHESIS: Status: ACTIVE | Noted: 2023-10-05

## 2023-10-05 LAB
GLUCOSE BLD STRIP.AUTO-MCNC: 86 MG/DL (ref 65–117)
SERVICE CMNT-IMP: NORMAL

## 2023-10-05 PROCEDURE — 2580000003 HC RX 258: Performed by: NEUROLOGICAL SURGERY

## 2023-10-05 PROCEDURE — 0SG00AJ FUSION OF LUMBAR VERTEBRAL JOINT WITH INTERBODY FUSION DEVICE, POSTERIOR APPROACH, ANTERIOR COLUMN, OPEN APPROACH: ICD-10-PCS | Performed by: NEUROLOGICAL SURGERY

## 2023-10-05 PROCEDURE — 1100000000 HC RM PRIVATE

## 2023-10-05 PROCEDURE — 01NB0ZZ RELEASE LUMBAR NERVE, OPEN APPROACH: ICD-10-PCS | Performed by: NEUROLOGICAL SURGERY

## 2023-10-05 PROCEDURE — 2500000003 HC RX 250 WO HCPCS

## 2023-10-05 PROCEDURE — 0ST40ZZ RESECTION OF LUMBOSACRAL DISC, OPEN APPROACH: ICD-10-PCS | Performed by: NEUROLOGICAL SURGERY

## 2023-10-05 PROCEDURE — C1729 CATH, DRAINAGE: HCPCS | Performed by: NEUROLOGICAL SURGERY

## 2023-10-05 PROCEDURE — 7100000001 HC PACU RECOVERY - ADDTL 15 MIN: Performed by: NEUROLOGICAL SURGERY

## 2023-10-05 PROCEDURE — 2700000000 HC OXYGEN THERAPY PER DAY

## 2023-10-05 PROCEDURE — 6360000002 HC RX W HCPCS: Performed by: NURSE ANESTHETIST, CERTIFIED REGISTERED

## 2023-10-05 PROCEDURE — 6370000000 HC RX 637 (ALT 250 FOR IP): Performed by: NEUROLOGICAL SURGERY

## 2023-10-05 PROCEDURE — P9045 ALBUMIN (HUMAN), 5%, 250 ML: HCPCS | Performed by: NURSE ANESTHETIST, CERTIFIED REGISTERED

## 2023-10-05 PROCEDURE — 01NR0ZZ RELEASE SACRAL NERVE, OPEN APPROACH: ICD-10-PCS | Performed by: NEUROLOGICAL SURGERY

## 2023-10-05 PROCEDURE — 3700000001 HC ADD 15 MINUTES (ANESTHESIA): Performed by: NEUROLOGICAL SURGERY

## 2023-10-05 PROCEDURE — 6360000002 HC RX W HCPCS: Performed by: NEUROLOGICAL SURGERY

## 2023-10-05 PROCEDURE — 2500000003 HC RX 250 WO HCPCS: Performed by: NURSE ANESTHETIST, CERTIFIED REGISTERED

## 2023-10-05 PROCEDURE — C1889 IMPLANT/INSERT DEVICE, NOC: HCPCS | Performed by: NEUROLOGICAL SURGERY

## 2023-10-05 PROCEDURE — 2709999900 HC NON-CHARGEABLE SUPPLY: Performed by: NEUROLOGICAL SURGERY

## 2023-10-05 PROCEDURE — 3700000000 HC ANESTHESIA ATTENDED CARE: Performed by: NEUROLOGICAL SURGERY

## 2023-10-05 PROCEDURE — 2500000003 HC RX 250 WO HCPCS: Performed by: NEUROLOGICAL SURGERY

## 2023-10-05 PROCEDURE — 0SG0071 FUSION OF LUMBAR VERTEBRAL JOINT WITH AUTOLOGOUS TISSUE SUBSTITUTE, POSTERIOR APPROACH, POSTERIOR COLUMN, OPEN APPROACH: ICD-10-PCS | Performed by: NEUROLOGICAL SURGERY

## 2023-10-05 PROCEDURE — 82962 GLUCOSE BLOOD TEST: CPT

## 2023-10-05 PROCEDURE — 7100000000 HC PACU RECOVERY - FIRST 15 MIN: Performed by: NEUROLOGICAL SURGERY

## 2023-10-05 PROCEDURE — 2720000010 HC SURG SUPPLY STERILE: Performed by: NEUROLOGICAL SURGERY

## 2023-10-05 PROCEDURE — 3600000014 HC SURGERY LEVEL 4 ADDTL 15MIN: Performed by: NEUROLOGICAL SURGERY

## 2023-10-05 PROCEDURE — 0SG30AJ FUSION OF LUMBOSACRAL JOINT WITH INTERBODY FUSION DEVICE, POSTERIOR APPROACH, ANTERIOR COLUMN, OPEN APPROACH: ICD-10-PCS | Performed by: NEUROLOGICAL SURGERY

## 2023-10-05 PROCEDURE — 3600000004 HC SURGERY LEVEL 4 BASE: Performed by: NEUROLOGICAL SURGERY

## 2023-10-05 PROCEDURE — 2580000003 HC RX 258: Performed by: ANESTHESIOLOGY

## 2023-10-05 PROCEDURE — 0SG3071 FUSION OF LUMBOSACRAL JOINT WITH AUTOLOGOUS TISSUE SUBSTITUTE, POSTERIOR APPROACH, POSTERIOR COLUMN, OPEN APPROACH: ICD-10-PCS | Performed by: NEUROLOGICAL SURGERY

## 2023-10-05 PROCEDURE — 94760 N-INVAS EAR/PLS OXIMETRY 1: CPT

## 2023-10-05 PROCEDURE — C1713 ANCHOR/SCREW BN/BN,TIS/BN: HCPCS | Performed by: NEUROLOGICAL SURGERY

## 2023-10-05 PROCEDURE — 6370000000 HC RX 637 (ALT 250 FOR IP): Performed by: ANESTHESIOLOGY

## 2023-10-05 PROCEDURE — 0ST20ZZ RESECTION OF LUMBAR VERTEBRAL DISC, OPEN APPROACH: ICD-10-PCS | Performed by: NEUROLOGICAL SURGERY

## 2023-10-05 PROCEDURE — 8E0WXBZ COMPUTER ASSISTED PROCEDURE OF TRUNK REGION: ICD-10-PCS | Performed by: NEUROLOGICAL SURGERY

## 2023-10-05 PROCEDURE — 94761 N-INVAS EAR/PLS OXIMETRY MLT: CPT

## 2023-10-05 PROCEDURE — 2580000003 HC RX 258: Performed by: NURSE ANESTHETIST, CERTIFIED REGISTERED

## 2023-10-05 DEVICE — ROD 1553201060 5.5 TI CP4 NS CURV 60MM
Type: IMPLANTABLE DEVICE | Site: SPINE LUMBAR | Status: FUNCTIONAL
Brand: CD HORIZON® SPINAL SYSTEM

## 2023-10-05 DEVICE — GRAFT BNE SUB L CANC FRZN MORSELIZED W/ VIABLE CELL TRINITY: Type: IMPLANTABLE DEVICE | Site: SPINE LUMBAR | Status: FUNCTIONAL

## 2023-10-05 DEVICE — GRAFT DELIVERY SYSTEM SET 12 CC W/ GRFT DBF CANN ACCELERATE: Type: IMPLANTABLE DEVICE | Site: SPINE LUMBAR | Status: FUNCTIONAL

## 2023-10-05 DEVICE — SPACER 6068076 CATALYFT PL LONG 7MM
Type: IMPLANTABLE DEVICE | Site: SPINE LUMBAR | Status: FUNCTIONAL
Brand: CATALYFT PL EXPANDABLE INTERBODY SYSTEM

## 2023-10-05 RX ORDER — SODIUM CHLORIDE 0.9 % (FLUSH) 0.9 %
5-40 SYRINGE (ML) INJECTION EVERY 12 HOURS SCHEDULED
Status: DISCONTINUED | OUTPATIENT
Start: 2023-10-05 | End: 2023-10-09 | Stop reason: HOSPADM

## 2023-10-05 RX ORDER — HYDROMORPHONE HYDROCHLORIDE 2 MG/ML
INJECTION, SOLUTION INTRAMUSCULAR; INTRAVENOUS; SUBCUTANEOUS PRN
Status: DISCONTINUED | OUTPATIENT
Start: 2023-10-05 | End: 2023-10-05 | Stop reason: SDUPTHER

## 2023-10-05 RX ORDER — SENNA AND DOCUSATE SODIUM 50; 8.6 MG/1; MG/1
1 TABLET, FILM COATED ORAL 2 TIMES DAILY
Status: DISCONTINUED | OUTPATIENT
Start: 2023-10-05 | End: 2023-10-09 | Stop reason: HOSPADM

## 2023-10-05 RX ORDER — SODIUM CHLORIDE 9 MG/ML
INJECTION, SOLUTION INTRAVENOUS PRN
Status: DISCONTINUED | OUTPATIENT
Start: 2023-10-05 | End: 2023-10-09 | Stop reason: HOSPADM

## 2023-10-05 RX ORDER — MAGNESIUM SULFATE HEPTAHYDRATE 40 MG/ML
INJECTION, SOLUTION INTRAVENOUS PRN
Status: DISCONTINUED | OUTPATIENT
Start: 2023-10-05 | End: 2023-10-05 | Stop reason: SDUPTHER

## 2023-10-05 RX ORDER — VENLAFAXINE HYDROCHLORIDE 150 MG/1
150 CAPSULE, EXTENDED RELEASE ORAL DAILY
Status: DISCONTINUED | OUTPATIENT
Start: 2023-10-06 | End: 2023-10-09 | Stop reason: HOSPADM

## 2023-10-05 RX ORDER — MAGNESIUM HYDROXIDE/ALUMINUM HYDROXICE/SIMETHICONE 120; 1200; 1200 MG/30ML; MG/30ML; MG/30ML
15 SUSPENSION ORAL EVERY 6 HOURS PRN
Status: DISCONTINUED | OUTPATIENT
Start: 2023-10-05 | End: 2023-10-09 | Stop reason: HOSPADM

## 2023-10-05 RX ORDER — LIDOCAINE HYDROCHLORIDE 10 MG/ML
1 INJECTION, SOLUTION EPIDURAL; INFILTRATION; INTRACAUDAL; PERINEURAL
Status: DISCONTINUED | OUTPATIENT
Start: 2023-10-05 | End: 2023-10-05 | Stop reason: HOSPADM

## 2023-10-05 RX ORDER — BISACODYL 10 MG
10 SUPPOSITORY, RECTAL RECTAL DAILY PRN
Status: DISCONTINUED | OUTPATIENT
Start: 2023-10-05 | End: 2023-10-09 | Stop reason: HOSPADM

## 2023-10-05 RX ORDER — LIDOCAINE HYDROCHLORIDE 20 MG/ML
INJECTION, SOLUTION EPIDURAL; INFILTRATION; INTRACAUDAL; PERINEURAL PRN
Status: DISCONTINUED | OUTPATIENT
Start: 2023-10-05 | End: 2023-10-05 | Stop reason: SDUPTHER

## 2023-10-05 RX ORDER — PROCHLORPERAZINE EDISYLATE 5 MG/ML
5 INJECTION INTRAMUSCULAR; INTRAVENOUS
Status: DISCONTINUED | OUTPATIENT
Start: 2023-10-05 | End: 2023-10-05 | Stop reason: HOSPADM

## 2023-10-05 RX ORDER — FENTANYL CITRATE 50 UG/ML
100 INJECTION, SOLUTION INTRAMUSCULAR; INTRAVENOUS
Status: DISCONTINUED | OUTPATIENT
Start: 2023-10-05 | End: 2023-10-05 | Stop reason: HOSPADM

## 2023-10-05 RX ORDER — SUCCINYLCHOLINE/SOD CL,ISO/PF 200MG/10ML
SYRINGE (ML) INTRAVENOUS PRN
Status: DISCONTINUED | OUTPATIENT
Start: 2023-10-05 | End: 2023-10-05 | Stop reason: SDUPTHER

## 2023-10-05 RX ORDER — DIPHENHYDRAMINE HYDROCHLORIDE 50 MG/ML
25 INJECTION INTRAMUSCULAR; INTRAVENOUS EVERY 6 HOURS PRN
Status: DISCONTINUED | OUTPATIENT
Start: 2023-10-05 | End: 2023-10-09 | Stop reason: HOSPADM

## 2023-10-05 RX ORDER — KETAMINE HCL IN NACL, ISO-OSM 100MG/10ML
SYRINGE (ML) INJECTION PRN
Status: DISCONTINUED | OUTPATIENT
Start: 2023-10-05 | End: 2023-10-05 | Stop reason: SDUPTHER

## 2023-10-05 RX ORDER — ALBUMIN, HUMAN INJ 5% 5 %
SOLUTION INTRAVENOUS PRN
Status: DISCONTINUED | OUTPATIENT
Start: 2023-10-05 | End: 2023-10-05 | Stop reason: SDUPTHER

## 2023-10-05 RX ORDER — SODIUM CHLORIDE, SODIUM LACTATE, POTASSIUM CHLORIDE, CALCIUM CHLORIDE 600; 310; 30; 20 MG/100ML; MG/100ML; MG/100ML; MG/100ML
INJECTION, SOLUTION INTRAVENOUS CONTINUOUS
Status: DISCONTINUED | OUTPATIENT
Start: 2023-10-05 | End: 2023-10-05 | Stop reason: HOSPADM

## 2023-10-05 RX ORDER — MIDAZOLAM HYDROCHLORIDE 2 MG/2ML
2 INJECTION, SOLUTION INTRAMUSCULAR; INTRAVENOUS
Status: DISCONTINUED | OUTPATIENT
Start: 2023-10-05 | End: 2023-10-05 | Stop reason: HOSPADM

## 2023-10-05 RX ORDER — ACETAMINOPHEN 325 MG/1
650 TABLET ORAL EVERY 6 HOURS
Status: DISCONTINUED | OUTPATIENT
Start: 2023-10-05 | End: 2023-10-09 | Stop reason: HOSPADM

## 2023-10-05 RX ORDER — SODIUM CHLORIDE 0.9 % (FLUSH) 0.9 %
5-40 SYRINGE (ML) INJECTION PRN
Status: DISCONTINUED | OUTPATIENT
Start: 2023-10-05 | End: 2023-10-05 | Stop reason: HOSPADM

## 2023-10-05 RX ORDER — SODIUM CHLORIDE 0.9 % (FLUSH) 0.9 %
5-40 SYRINGE (ML) INJECTION EVERY 12 HOURS SCHEDULED
Status: DISCONTINUED | OUTPATIENT
Start: 2023-10-05 | End: 2023-10-05 | Stop reason: HOSPADM

## 2023-10-05 RX ORDER — SODIUM CHLORIDE 9 MG/ML
INJECTION, SOLUTION INTRAVENOUS PRN
Status: DISCONTINUED | OUTPATIENT
Start: 2023-10-05 | End: 2023-10-05 | Stop reason: HOSPADM

## 2023-10-05 RX ORDER — ONDANSETRON 2 MG/ML
4 INJECTION INTRAMUSCULAR; INTRAVENOUS EVERY 6 HOURS PRN
Status: DISCONTINUED | OUTPATIENT
Start: 2023-10-05 | End: 2023-10-09 | Stop reason: HOSPADM

## 2023-10-05 RX ORDER — HYDRALAZINE HYDROCHLORIDE 20 MG/ML
10 INJECTION INTRAMUSCULAR; INTRAVENOUS
Status: DISCONTINUED | OUTPATIENT
Start: 2023-10-05 | End: 2023-10-05 | Stop reason: HOSPADM

## 2023-10-05 RX ORDER — OXYCODONE HYDROCHLORIDE 5 MG/1
5 TABLET ORAL
Status: DISCONTINUED | OUTPATIENT
Start: 2023-10-05 | End: 2023-10-05 | Stop reason: HOSPADM

## 2023-10-05 RX ORDER — MIDAZOLAM HYDROCHLORIDE 1 MG/ML
INJECTION INTRAMUSCULAR; INTRAVENOUS PRN
Status: DISCONTINUED | OUTPATIENT
Start: 2023-10-05 | End: 2023-10-05 | Stop reason: SDUPTHER

## 2023-10-05 RX ORDER — DIPHENHYDRAMINE HCL 25 MG
25 CAPSULE ORAL EVERY 6 HOURS PRN
Status: DISCONTINUED | OUTPATIENT
Start: 2023-10-05 | End: 2023-10-09 | Stop reason: HOSPADM

## 2023-10-05 RX ORDER — ACETAMINOPHEN 500 MG
1000 TABLET ORAL ONCE
Status: DISCONTINUED | OUTPATIENT
Start: 2023-10-05 | End: 2023-10-05

## 2023-10-05 RX ORDER — OXYCODONE HYDROCHLORIDE 5 MG/1
10 TABLET ORAL EVERY 4 HOURS PRN
Status: DISCONTINUED | OUTPATIENT
Start: 2023-10-05 | End: 2023-10-09 | Stop reason: HOSPADM

## 2023-10-05 RX ORDER — ONDANSETRON 2 MG/ML
INJECTION INTRAMUSCULAR; INTRAVENOUS PRN
Status: DISCONTINUED | OUTPATIENT
Start: 2023-10-05 | End: 2023-10-05 | Stop reason: SDUPTHER

## 2023-10-05 RX ORDER — PHENYLEPHRINE HCL IN 0.9% NACL 0.4MG/10ML
SYRINGE (ML) INTRAVENOUS PRN
Status: DISCONTINUED | OUTPATIENT
Start: 2023-10-05 | End: 2023-10-05 | Stop reason: SDUPTHER

## 2023-10-05 RX ORDER — MORPHINE SULFATE 2 MG/ML
2 INJECTION, SOLUTION INTRAMUSCULAR; INTRAVENOUS
Status: DISCONTINUED | OUTPATIENT
Start: 2023-10-05 | End: 2023-10-09 | Stop reason: HOSPADM

## 2023-10-05 RX ORDER — POLYETHYLENE GLYCOL 3350 17 G/17G
17 POWDER, FOR SOLUTION ORAL DAILY PRN
Status: DISCONTINUED | OUTPATIENT
Start: 2023-10-05 | End: 2023-10-09 | Stop reason: HOSPADM

## 2023-10-05 RX ORDER — ONDANSETRON 4 MG/1
4 TABLET, ORALLY DISINTEGRATING ORAL EVERY 8 HOURS PRN
Status: DISCONTINUED | OUTPATIENT
Start: 2023-10-05 | End: 2023-10-09 | Stop reason: HOSPADM

## 2023-10-05 RX ORDER — HYDROMORPHONE HYDROCHLORIDE 1 MG/ML
0.5 INJECTION, SOLUTION INTRAMUSCULAR; INTRAVENOUS; SUBCUTANEOUS EVERY 5 MIN PRN
Status: DISCONTINUED | OUTPATIENT
Start: 2023-10-05 | End: 2023-10-05 | Stop reason: HOSPADM

## 2023-10-05 RX ORDER — MORPHINE SULFATE 4 MG/ML
4 INJECTION, SOLUTION INTRAMUSCULAR; INTRAVENOUS
Status: DISCONTINUED | OUTPATIENT
Start: 2023-10-05 | End: 2023-10-09 | Stop reason: HOSPADM

## 2023-10-05 RX ORDER — ONDANSETRON 2 MG/ML
4 INJECTION INTRAMUSCULAR; INTRAVENOUS
Status: DISCONTINUED | OUTPATIENT
Start: 2023-10-05 | End: 2023-10-05 | Stop reason: HOSPADM

## 2023-10-05 RX ORDER — CYCLOBENZAPRINE HCL 10 MG
10 TABLET ORAL 3 TIMES DAILY PRN
Status: DISCONTINUED | OUTPATIENT
Start: 2023-10-05 | End: 2023-10-09 | Stop reason: HOSPADM

## 2023-10-05 RX ORDER — LIDOCAINE HYDROCHLORIDE AND EPINEPHRINE 10; 10 MG/ML; UG/ML
INJECTION, SOLUTION INFILTRATION; PERINEURAL PRN
Status: DISCONTINUED | OUTPATIENT
Start: 2023-10-05 | End: 2023-10-05 | Stop reason: HOSPADM

## 2023-10-05 RX ORDER — SODIUM CHLORIDE, SODIUM LACTATE, POTASSIUM CHLORIDE, CALCIUM CHLORIDE 600; 310; 30; 20 MG/100ML; MG/100ML; MG/100ML; MG/100ML
INJECTION, SOLUTION INTRAVENOUS CONTINUOUS PRN
Status: DISCONTINUED | OUTPATIENT
Start: 2023-10-05 | End: 2023-10-05 | Stop reason: SDUPTHER

## 2023-10-05 RX ORDER — DEXMEDETOMIDINE HYDROCHLORIDE 100 UG/ML
INJECTION, SOLUTION INTRAVENOUS PRN
Status: DISCONTINUED | OUTPATIENT
Start: 2023-10-05 | End: 2023-10-05 | Stop reason: SDUPTHER

## 2023-10-05 RX ORDER — DEXAMETHASONE SODIUM PHOSPHATE 4 MG/ML
INJECTION, SOLUTION INTRA-ARTICULAR; INTRALESIONAL; INTRAMUSCULAR; INTRAVENOUS; SOFT TISSUE PRN
Status: DISCONTINUED | OUTPATIENT
Start: 2023-10-05 | End: 2023-10-05 | Stop reason: SDUPTHER

## 2023-10-05 RX ORDER — FENTANYL CITRATE 50 UG/ML
INJECTION, SOLUTION INTRAMUSCULAR; INTRAVENOUS PRN
Status: DISCONTINUED | OUTPATIENT
Start: 2023-10-05 | End: 2023-10-05 | Stop reason: SDUPTHER

## 2023-10-05 RX ORDER — EPHEDRINE SULFATE/0.9% NACL/PF 50 MG/5 ML
SYRINGE (ML) INTRAVENOUS PRN
Status: DISCONTINUED | OUTPATIENT
Start: 2023-10-05 | End: 2023-10-05 | Stop reason: SDUPTHER

## 2023-10-05 RX ORDER — ACETAMINOPHEN 500 MG
1000 TABLET ORAL ONCE
Status: COMPLETED | OUTPATIENT
Start: 2023-10-05 | End: 2023-10-05

## 2023-10-05 RX ORDER — OXYCODONE HYDROCHLORIDE 5 MG/1
5 TABLET ORAL EVERY 4 HOURS PRN
Status: DISCONTINUED | OUTPATIENT
Start: 2023-10-05 | End: 2023-10-09 | Stop reason: HOSPADM

## 2023-10-05 RX ORDER — FENTANYL CITRATE 50 UG/ML
25 INJECTION, SOLUTION INTRAMUSCULAR; INTRAVENOUS EVERY 5 MIN PRN
Status: DISCONTINUED | OUTPATIENT
Start: 2023-10-05 | End: 2023-10-05 | Stop reason: HOSPADM

## 2023-10-05 RX ORDER — SODIUM CHLORIDE 0.9 % (FLUSH) 0.9 %
5-40 SYRINGE (ML) INJECTION PRN
Status: DISCONTINUED | OUTPATIENT
Start: 2023-10-05 | End: 2023-10-09 | Stop reason: HOSPADM

## 2023-10-05 RX ORDER — SODIUM CHLORIDE, SODIUM LACTATE, POTASSIUM CHLORIDE, CALCIUM CHLORIDE 600; 310; 30; 20 MG/100ML; MG/100ML; MG/100ML; MG/100ML
INJECTION, SOLUTION INTRAVENOUS CONTINUOUS
Status: DISCONTINUED | OUTPATIENT
Start: 2023-10-05 | End: 2023-10-06

## 2023-10-05 RX ORDER — ROCURONIUM BROMIDE 10 MG/ML
INJECTION, SOLUTION INTRAVENOUS PRN
Status: DISCONTINUED | OUTPATIENT
Start: 2023-10-05 | End: 2023-10-05 | Stop reason: SDUPTHER

## 2023-10-05 RX ADMIN — Medication 10 MG: at 15:34

## 2023-10-05 RX ADMIN — FENTANYL CITRATE 50 MCG: 0.05 INJECTION, SOLUTION INTRAMUSCULAR; INTRAVENOUS at 16:08

## 2023-10-05 RX ADMIN — Medication 30 MG: at 12:27

## 2023-10-05 RX ADMIN — Medication 10 MG: at 14:04

## 2023-10-05 RX ADMIN — HYDROMORPHONE HYDROCHLORIDE 0.5 MG: 2 INJECTION, SOLUTION INTRAMUSCULAR; INTRAVENOUS; SUBCUTANEOUS at 16:56

## 2023-10-05 RX ADMIN — ROCURONIUM BROMIDE 40 MG: 10 INJECTION, SOLUTION INTRAVENOUS at 14:33

## 2023-10-05 RX ADMIN — ALBUMIN (HUMAN) 250 ML: 12.5 INJECTION, SOLUTION INTRAVENOUS at 16:29

## 2023-10-05 RX ADMIN — Medication 40 MCG: at 12:34

## 2023-10-05 RX ADMIN — FENTANYL CITRATE 50 MCG: 0.05 INJECTION, SOLUTION INTRAMUSCULAR; INTRAVENOUS at 13:03

## 2023-10-05 RX ADMIN — DEXMEDETOMIDINE HYDROCHLORIDE 4 MCG: 100 INJECTION, SOLUTION, CONCENTRATE INTRAVENOUS at 12:29

## 2023-10-05 RX ADMIN — Medication 120 MCG: at 17:44

## 2023-10-05 RX ADMIN — MAGNESIUM SULFATE HEPTAHYDRATE 2000 MG: 40 INJECTION, SOLUTION INTRAVENOUS at 12:48

## 2023-10-05 RX ADMIN — ROCURONIUM BROMIDE 10 MG: 10 INJECTION, SOLUTION INTRAVENOUS at 12:22

## 2023-10-05 RX ADMIN — SODIUM CHLORIDE, POTASSIUM CHLORIDE, SODIUM LACTATE AND CALCIUM CHLORIDE: 600; 310; 30; 20 INJECTION, SOLUTION INTRAVENOUS at 19:48

## 2023-10-05 RX ADMIN — ONDANSETRON 4 MG: 2 INJECTION INTRAMUSCULAR; INTRAVENOUS at 17:31

## 2023-10-05 RX ADMIN — Medication 10 MG: at 17:07

## 2023-10-05 RX ADMIN — ACETAMINOPHEN 650 MG: 325 TABLET ORAL at 20:58

## 2023-10-05 RX ADMIN — SENNOSIDES AND DOCUSATE SODIUM 1 TABLET: 50; 8.6 TABLET ORAL at 20:58

## 2023-10-05 RX ADMIN — PHENYLEPHRINE HYDROCHLORIDE 50 MCG/MIN: 10 INJECTION INTRAVENOUS at 12:45

## 2023-10-05 RX ADMIN — DEXMEDETOMIDINE HYDROCHLORIDE 8 MCG: 100 INJECTION, SOLUTION, CONCENTRATE INTRAVENOUS at 12:51

## 2023-10-05 RX ADMIN — Medication 120 MG: at 12:23

## 2023-10-05 RX ADMIN — ACETAMINOPHEN 1000 MG: 500 TABLET ORAL at 11:33

## 2023-10-05 RX ADMIN — DEXAMETHASONE SODIUM PHOSPHATE 8 MG: 4 INJECTION, SOLUTION INTRAMUSCULAR; INTRAVENOUS at 12:26

## 2023-10-05 RX ADMIN — DEXMEDETOMIDINE HYDROCHLORIDE 8 MCG: 100 INJECTION, SOLUTION, CONCENTRATE INTRAVENOUS at 12:48

## 2023-10-05 RX ADMIN — WATER 2000 MG: 1 INJECTION INTRAMUSCULAR; INTRAVENOUS; SUBCUTANEOUS at 16:30

## 2023-10-05 RX ADMIN — WATER 2000 MG: 1 INJECTION INTRAMUSCULAR; INTRAVENOUS; SUBCUTANEOUS at 12:30

## 2023-10-05 RX ADMIN — OXYCODONE HYDROCHLORIDE 10 MG: 5 TABLET ORAL at 21:01

## 2023-10-05 RX ADMIN — PROPOFOL 30 MG: 10 INJECTION, EMULSION INTRAVENOUS at 17:48

## 2023-10-05 RX ADMIN — SUGAMMADEX 200 MG: 100 INJECTION, SOLUTION INTRAVENOUS at 17:48

## 2023-10-05 RX ADMIN — PROPOFOL 120 MG: 10 INJECTION, EMULSION INTRAVENOUS at 12:22

## 2023-10-05 RX ADMIN — FENTANYL CITRATE 50 MCG: 0.05 INJECTION, SOLUTION INTRAMUSCULAR; INTRAVENOUS at 14:35

## 2023-10-05 RX ADMIN — MIDAZOLAM 2 MG: 1 INJECTION INTRAMUSCULAR; INTRAVENOUS at 12:15

## 2023-10-05 RX ADMIN — SODIUM CHLORIDE, POTASSIUM CHLORIDE, SODIUM LACTATE AND CALCIUM CHLORIDE: 600; 310; 30; 20 INJECTION, SOLUTION INTRAVENOUS at 11:25

## 2023-10-05 RX ADMIN — FENTANYL CITRATE 100 MCG: 0.05 INJECTION, SOLUTION INTRAMUSCULAR; INTRAVENOUS at 12:22

## 2023-10-05 RX ADMIN — LIDOCAINE HYDROCHLORIDE 60 MG: 20 INJECTION, SOLUTION EPIDURAL; INFILTRATION; INTRACAUDAL; PERINEURAL at 12:21

## 2023-10-05 RX ADMIN — Medication 10 MG: at 17:36

## 2023-10-05 RX ADMIN — SODIUM CHLORIDE, POTASSIUM CHLORIDE, SODIUM LACTATE AND CALCIUM CHLORIDE: 600; 310; 30; 20 INJECTION, SOLUTION INTRAVENOUS at 12:10

## 2023-10-05 RX ADMIN — PROPOFOL 30 MG: 10 INJECTION, EMULSION INTRAVENOUS at 17:46

## 2023-10-05 RX ADMIN — Medication 10 MG: at 12:34

## 2023-10-05 RX ADMIN — DEXMEDETOMIDINE HYDROCHLORIDE 8 MCG: 100 INJECTION, SOLUTION, CONCENTRATE INTRAVENOUS at 12:43

## 2023-10-05 RX ADMIN — ROCURONIUM BROMIDE 40 MG: 10 INJECTION, SOLUTION INTRAVENOUS at 12:34

## 2023-10-05 RX ADMIN — ONDANSETRON 4 MG: 2 INJECTION INTRAMUSCULAR; INTRAVENOUS at 12:26

## 2023-10-05 RX ADMIN — Medication 80 MCG: at 14:43

## 2023-10-05 ASSESSMENT — PAIN SCALES - GENERAL
PAINLEVEL_OUTOF10: 8
PAINLEVEL_OUTOF10: 2
PAINLEVEL_OUTOF10: 8

## 2023-10-05 ASSESSMENT — PAIN DESCRIPTION - LOCATION
LOCATION: BACK
LOCATION: BACK

## 2023-10-05 ASSESSMENT — PAIN - FUNCTIONAL ASSESSMENT: PAIN_FUNCTIONAL_ASSESSMENT: 0-10

## 2023-10-05 ASSESSMENT — PAIN DESCRIPTION - DESCRIPTORS: DESCRIPTORS: ACHING

## 2023-10-05 NOTE — PROGRESS NOTES
Spoke with patient's huband and informed them that patient is still in surgery and updated on patient's well being.

## 2023-10-05 NOTE — PERIOP NOTE
TRANSFER - OUT REPORT:    Verbal report given to Barron Brownlee RN on [address]  being transferred to [facility] for routine post-op       Report consisted of patient's Situation, Background, Assessment and   Recommendations(SBAR). Information from the following report(s) Nurse Handoff Report, Adult Overview, Surgery Report, MAR, and Recent Results was reviewed with the receiving nurse. Lines:   Peripheral IV 10/05/23 Right; Anterior Forearm (Active)   Site Assessment Clean, dry & intact 10/05/23 1802   Line Status Infusing 10/05/23 1802   Line Care Connections checked and tightened 10/05/23 1802   Phlebitis Assessment No symptoms 10/05/23 1802   Infiltration Assessment 0 10/05/23 1802   Alcohol Cap Used Yes 10/05/23 1802   Dressing Status Clean, dry & intact 10/05/23 1802   Dressing Type Transparent 10/05/23 1802       Peripheral IV 10/05/23 Left;Posterior Hand (Active)   Site Assessment Clean, dry & intact 10/05/23 1802   Line Status Capped 10/05/23 1802   Line Care Connections checked and tightened 10/05/23 1802   Phlebitis Assessment No symptoms 10/05/23 1802   Infiltration Assessment 0 10/05/23 1802   Alcohol Cap Used Yes 10/05/23 1802   Dressing Status Clean, dry & intact 10/05/23 1802   Dressing Type Transparent 10/05/23 1802        Opportunity for questions and clarification was provided.

## 2023-10-05 NOTE — BRIEF OP NOTE
SPNL L50MM DIA6. 5MM POST THORACOLUMBOSACRAL CO CHROM N/A [employer] SOFAMOR DANEK-WD N/A N/A 4 Implanted   SCREW SPNL L40MM DIA7. 5MM POST THORACOLUMBOSACRAL CO CHROM - SN/A  SCREW SPNL L40MM DIA7. 5MM POST THORACOLUMBOSACRAL CO CHROM N/A [employer] SOFAMOR DANEK-WD N/A N/A 2 Implanted   CECILIA SPNL L60MM DIA5. 5MM ANT POST THORACOLUMBOSACRAL TI - SN/A  CECILIA SPNL L60MM DIA5. 5MM ANT POST THORACOLUMBOSACRAL TI N/A [employer] SOFAMOR DANEK-WD N/A N/A 1 Implanted   CECILIA SPNL L70MM DIA5. 5MM [facility] SOLERA - SN/A  CECILIA SPNL L70MM DIA5. 5MM [facility] N/A [employer] SOFAMOR DANEK-WD N/A N/A 1 Implanted         Drains:   Closed/Suction Drain Left; Inferior;Posterior Back Accordion (Active)       Urinary Catheter 10/05/23 2 Way; [place] (Active)       Findings: severe nerve compression from scar, bone and disc on left at L4/5 and L5/S1, excellent decompression      Electronically signed by Eliud Pinzon MD on 10/5/2023 at 5:51 PM

## 2023-10-06 LAB
ANION GAP SERPL CALC-SCNC: 6 MMOL/L (ref 5–15)
BUN SERPL-MCNC: 13 MG/DL (ref 6–20)
BUN/CREAT SERPL: 18 (ref 12–20)
CALCIUM SERPL-MCNC: 8.2 MG/DL (ref 8.5–10.1)
CHLORIDE SERPL-SCNC: 106 MMOL/L (ref 97–108)
CO2 SERPL-SCNC: 25 MMOL/L (ref 21–32)
CREAT SERPL-MCNC: 0.72 MG/DL (ref 0.55–1.02)
ERYTHROCYTE [DISTWIDTH] IN BLOOD BY AUTOMATED COUNT: 13.1 % (ref 11.5–14.5)
GLUCOSE SERPL-MCNC: 133 MG/DL (ref 65–100)
HCT VFR BLD AUTO: 27 % (ref 35–47)
HGB BLD-MCNC: 8.7 G/DL (ref 11.5–16)
MCH RBC QN AUTO: 27.4 PG (ref 26–34)
MCHC RBC AUTO-ENTMCNC: 32.2 G/DL (ref 30–36.5)
MCV RBC AUTO: 85.2 FL (ref 80–99)
NRBC # BLD: 0 K/UL (ref 0–0.01)
NRBC BLD-RTO: 0 PER 100 WBC
PLATELET # BLD AUTO: 165 K/UL (ref 150–400)
PMV BLD AUTO: 10.2 FL (ref 8.9–12.9)
POTASSIUM SERPL-SCNC: 4.5 MMOL/L (ref 3.5–5.1)
RBC # BLD AUTO: 3.17 M/UL (ref 3.8–5.2)
SODIUM SERPL-SCNC: 137 MMOL/L (ref 136–145)
WBC # BLD AUTO: 8 K/UL (ref 3.6–11)

## 2023-10-06 PROCEDURE — 97530 THERAPEUTIC ACTIVITIES: CPT

## 2023-10-06 PROCEDURE — 2580000003 HC RX 258: Performed by: NEUROLOGICAL SURGERY

## 2023-10-06 PROCEDURE — 97535 SELF CARE MNGMENT TRAINING: CPT

## 2023-10-06 PROCEDURE — 6370000000 HC RX 637 (ALT 250 FOR IP): Performed by: NEUROLOGICAL SURGERY

## 2023-10-06 PROCEDURE — 6370000000 HC RX 637 (ALT 250 FOR IP): Performed by: PHYSICIAN ASSISTANT

## 2023-10-06 PROCEDURE — 97116 GAIT TRAINING THERAPY: CPT

## 2023-10-06 PROCEDURE — 85027 COMPLETE CBC AUTOMATED: CPT

## 2023-10-06 PROCEDURE — 6360000002 HC RX W HCPCS: Performed by: NEUROLOGICAL SURGERY

## 2023-10-06 PROCEDURE — 99024 POSTOP FOLLOW-UP VISIT: CPT | Performed by: PHYSICIAN ASSISTANT

## 2023-10-06 PROCEDURE — 80048 BASIC METABOLIC PNL TOTAL CA: CPT

## 2023-10-06 PROCEDURE — 1100000000 HC RM PRIVATE

## 2023-10-06 PROCEDURE — 97161 PT EVAL LOW COMPLEX 20 MIN: CPT

## 2023-10-06 PROCEDURE — 97165 OT EVAL LOW COMPLEX 30 MIN: CPT

## 2023-10-06 PROCEDURE — 36415 COLL VENOUS BLD VENIPUNCTURE: CPT

## 2023-10-06 RX ORDER — OXYCODONE HYDROCHLORIDE 5 MG/1
5 TABLET ORAL EVERY 4 HOURS PRN
Qty: 30 TABLET | Refills: 0 | Status: SHIPPED | OUTPATIENT
Start: 2023-10-06 | End: 2023-10-20

## 2023-10-06 RX ORDER — TRAMADOL HYDROCHLORIDE 50 MG/1
50 TABLET ORAL EVERY 6 HOURS PRN
Status: DISCONTINUED | OUTPATIENT
Start: 2023-10-06 | End: 2023-10-09 | Stop reason: HOSPADM

## 2023-10-06 RX ADMIN — MAGNESIUM HYDROXIDE 30 ML: 400 SUSPENSION ORAL at 11:02

## 2023-10-06 RX ADMIN — WATER 2000 MG: 1 INJECTION INTRAMUSCULAR; INTRAVENOUS; SUBCUTANEOUS at 06:24

## 2023-10-06 RX ADMIN — SENNOSIDES AND DOCUSATE SODIUM 1 TABLET: 50; 8.6 TABLET ORAL at 20:12

## 2023-10-06 RX ADMIN — ACETAMINOPHEN 650 MG: 325 TABLET ORAL at 20:12

## 2023-10-06 RX ADMIN — ACETAMINOPHEN 650 MG: 325 TABLET ORAL at 14:58

## 2023-10-06 RX ADMIN — WATER 2000 MG: 1 INJECTION INTRAMUSCULAR; INTRAVENOUS; SUBCUTANEOUS at 00:34

## 2023-10-06 RX ADMIN — ACETAMINOPHEN 650 MG: 325 TABLET ORAL at 06:24

## 2023-10-06 RX ADMIN — VENLAFAXINE HYDROCHLORIDE 150 MG: 150 CAPSULE, EXTENDED RELEASE ORAL at 09:32

## 2023-10-06 ASSESSMENT — PAIN DESCRIPTION - DESCRIPTORS: DESCRIPTORS: ACHING

## 2023-10-06 ASSESSMENT — PAIN SCALES - GENERAL
PAINLEVEL_OUTOF10: 8
PAINLEVEL_OUTOF10: 4
PAINLEVEL_OUTOF10: 4

## 2023-10-06 ASSESSMENT — PAIN DESCRIPTION - LOCATION: LOCATION: HEAD

## 2023-10-06 NOTE — PROGRESS NOTES
Hospital follow-up NEW PCP transitional care appointment has been scheduled with Dr. Clara Blue for Wednesday, October 25th, 2023 at 2:00 p.m. Please plan to arrive 15 min early with Insurance card, ID Pending patient discharge summary.   Shira Navarro, Care Management Assistant

## 2023-10-06 NOTE — CARE COORDINATION
Care Management Initial Assessment       RUR: 8%  Readmission? No  1st IM letter given? No [facility]    Home health pending and rolling walker pending delivery. The patient plans to discharge home with home health and family to transport. The patient lives with her , Jose Alejandro Rosario ([phone]). The patient is independent, drives a vehicle, uses [facility] pharmacy on [address], has no history of home health or rehab, lives in a 1 level private residence with 2 steps to enter and plans to discharge home when stable for discharge. 10/06/23 1133   Service Assessment   Patient Orientation Alert and Oriented   Cognition Alert   History Provided By Patient   Primary [address] Spouse/Significant Other   Patient's Healthcare Decision Maker is: Legal Next of Kin   PCP Verified by CM No  (New PCP appointment being scheduled.)   Prior Functional Level Independent in ADLs/IADLs   Current Functional Level Assistance with the following:;Mobility   Can patient return to prior living arrangement Yes   Ability to make needs known: Good   Family able to assist with home care needs: Yes   Would you like for me to discuss the discharge plan with any other family members/significant others, and if so, who?   (N/A)   Condition of Participation: Discharge Planning   The Plan for Transition of Care is related to the following treatment goals: The patient plans to discharge home. The Patient and/or Patient Representative was provided with a Choice of Provider? Patient   The Patient and/Or Patient Representative agree with the Discharge Plan? Yes   Freedom of Choice list was provided with basic dialogue that supports the patient's individualized plan of care/goals, treatment preferences, and shares the quality data associated with the providers?   Yes     Ericka Rosenthal RN/CRM Oriented - self; Oriented - place; Oriented - time

## 2023-10-06 NOTE — DISCHARGE INSTRUCTIONS
After Hospital Care Plan:  Discharge Instructions Lumbar Fusion Surgery   Dr. Malhotra Courser    Patient Name: Gustavo Stack    Date of procedure: 10/5/23     Procedure: Procedure(s):  L4-5, L5-S1 DECOMPRESSION AND FUSION, L4-5, L5-S1 POSTERIOR LUMBAR INTERBODY FUSION (O-ARM) (EIP 1100)     Follow up appointments  -follow up with Dr. Malhotra Coursetay in 2 weeks. Call [phone] to make an appointment as soon as you get home from the hospital.    When to call your Spine Surgeon:  -Signs of infection-if your incision is red; continues to have drainage; drainage has a foul odor or if you have a persistent fever over 101 degrees for 24 hours  -Nausea or vomiting, severe headache  -Loss of bowel or bladder function, inability to urinate  -Changes in sensation in your arms or legs (numbness, tingling, loss of color)  -Increased weakness-greater than before your surgery  -Severe pain or pain not relieved by medications  -Signs of a blood clot in your leg-calf pain, tenderness, redness, swelling of lower leg    When to call your Primary Care Physician:  -Concerns about medical conditions such as diabetes, high blood pressure, asthma, congestive heart failure  -Call if blood sugars are elevated, persistent headache or dizziness, coughing or congestion, constipation or diarrhea, burning with urination, abnormal heart rate    When to call 911 and go to the nearest emergency room:  -Acute onset of chest pain, shortness of breath, difficulty breathing    Activity  -You are going home a well person, be as active as possible. Your only exercise should be walking. Start with short frequent walks and increase your walking distance each day.  -Limit the amount of time you sit to 20-30 minute intervals. Sitting for prolonged periods of time will be uncomfortable for you following surgery.  -Do NOT lift anything over 5 pounds  -Do NOT do any straining, twisting or bending  -When you are in bed, you may lay on your back or on either side.   Do NOT

## 2023-10-06 NOTE — OP NOTE
[facility]  OPERATIVE REPORT    Name:  Ramirez Bolden  MR#:  [id number]  :  1960  ACCOUNT #:  [de-identified]  DATE OF SERVICE:  10/05/2023    PREOPERATIVE DIAGNOSES:  1. Lumbosacral instability. 2.  Lumbar radiculopathy with severe neural compression. POSTOPERATIVE DIAGNOSES:  1. Lumbosacral instability. 2.  Lumbar radiculopathy with severe neural compression. PROCEDURES PERFORMED:  L4-5 and L5-S1 decompression and fusion using autograft-allograft pedicle screws and rods; L4-L5, L5-S1 posterior lumbar interbody fusion using O-arm navigation; L4-L5 to L5-S1 wide decompression using microscope. SURGEON:  Markos Yousif MD    ASSISTANT:  1. Valerie Carroll. 2.  Kita Sylvester. ANESTHESIA:  General.    COMPLICATIONS:  None. SPECIMENS REMOVED:  None. IMPLANTS:  Per the operative record. DRAINS:  Hemovac. ESTIMATED BLOOD LOSS:  300 mL. FINDINGS:  Severe nerve compression from scar, bone, and disk on the left at L4-5 and L5-S1. Excellent decompression when I finished. INDICATIONS:  This is a 79-year-old woman who underwent an L4-5 diskectomy last year in [place]. She did well for approximately 6 months and then her symptoms returned. She failed all forms of conservative treatment. She went to another physician who recommended repeat surgery. She had surgery at L4-5 and L5-S1 on the left. She developed postoperative seroma and she also developed skin irritation to the glue that was used, and she had no improvement in her symptoms. She has had difficulty standing or sitting secondary to radicular pain in her left lower extremity. Imaging studies revealed severe collapse of the disk spaces at L4-5 and L5-S1 with significant neural foraminal narrowing, particularly at L4-5 and L5-S1 on the left, in part due to disk bulging and loss of disk and foramen space height. Risks and benefits of surgical decompression and fusion were discussed, she understood these.   I told

## 2023-10-07 LAB
HCT VFR BLD AUTO: 26.6 % (ref 35–47)
HGB BLD-MCNC: 8.7 G/DL (ref 11.5–16)

## 2023-10-07 PROCEDURE — 85014 HEMATOCRIT: CPT

## 2023-10-07 PROCEDURE — 97530 THERAPEUTIC ACTIVITIES: CPT

## 2023-10-07 PROCEDURE — 97110 THERAPEUTIC EXERCISES: CPT

## 2023-10-07 PROCEDURE — 6370000000 HC RX 637 (ALT 250 FOR IP): Performed by: NEUROLOGICAL SURGERY

## 2023-10-07 PROCEDURE — 1100000000 HC RM PRIVATE

## 2023-10-07 PROCEDURE — 6360000002 HC RX W HCPCS: Performed by: NEUROLOGICAL SURGERY

## 2023-10-07 PROCEDURE — 85018 HEMOGLOBIN: CPT

## 2023-10-07 PROCEDURE — 36415 COLL VENOUS BLD VENIPUNCTURE: CPT

## 2023-10-07 PROCEDURE — 6370000000 HC RX 637 (ALT 250 FOR IP): Performed by: PHYSICIAN ASSISTANT

## 2023-10-07 PROCEDURE — 97116 GAIT TRAINING THERAPY: CPT

## 2023-10-07 PROCEDURE — 2580000003 HC RX 258: Performed by: NEUROLOGICAL SURGERY

## 2023-10-07 RX ORDER — SODIUM CHLORIDE, SODIUM LACTATE, POTASSIUM CHLORIDE, CALCIUM CHLORIDE 600; 310; 30; 20 MG/100ML; MG/100ML; MG/100ML; MG/100ML
INJECTION, SOLUTION INTRAVENOUS CONTINUOUS
Status: DISCONTINUED | OUTPATIENT
Start: 2023-10-07 | End: 2023-10-09

## 2023-10-07 RX ADMIN — CYCLOBENZAPRINE 10 MG: 10 TABLET, FILM COATED ORAL at 10:24

## 2023-10-07 RX ADMIN — ONDANSETRON 4 MG: 2 INJECTION INTRAMUSCULAR; INTRAVENOUS at 07:30

## 2023-10-07 RX ADMIN — SODIUM CHLORIDE, PRESERVATIVE FREE 10 ML: 5 INJECTION INTRAVENOUS at 10:27

## 2023-10-07 RX ADMIN — ACETAMINOPHEN 650 MG: 325 TABLET ORAL at 21:33

## 2023-10-07 RX ADMIN — ACETAMINOPHEN 650 MG: 325 TABLET ORAL at 17:20

## 2023-10-07 RX ADMIN — MAGNESIUM HYDROXIDE 30 ML: 400 SUSPENSION ORAL at 10:24

## 2023-10-07 RX ADMIN — OXYCODONE HYDROCHLORIDE 10 MG: 5 TABLET ORAL at 07:27

## 2023-10-07 RX ADMIN — ACETAMINOPHEN 650 MG: 325 TABLET ORAL at 07:27

## 2023-10-07 RX ADMIN — SODIUM CHLORIDE, POTASSIUM CHLORIDE, SODIUM LACTATE AND CALCIUM CHLORIDE: 600; 310; 30; 20 INJECTION, SOLUTION INTRAVENOUS at 17:14

## 2023-10-07 RX ADMIN — SENNOSIDES AND DOCUSATE SODIUM 1 TABLET: 50; 8.6 TABLET ORAL at 21:33

## 2023-10-07 RX ADMIN — OXYCODONE HYDROCHLORIDE 10 MG: 5 TABLET ORAL at 17:20

## 2023-10-07 RX ADMIN — VENLAFAXINE HYDROCHLORIDE 150 MG: 150 CAPSULE, EXTENDED RELEASE ORAL at 10:24

## 2023-10-07 RX ADMIN — SENNOSIDES AND DOCUSATE SODIUM 1 TABLET: 50; 8.6 TABLET ORAL at 10:24

## 2023-10-07 ASSESSMENT — PAIN DESCRIPTION - DESCRIPTORS: DESCRIPTORS: ACHING

## 2023-10-07 ASSESSMENT — PAIN SCALES - GENERAL
PAINLEVEL_OUTOF10: 6
PAINLEVEL_OUTOF10: 5
PAINLEVEL_OUTOF10: 9

## 2023-10-07 ASSESSMENT — PAIN DESCRIPTION - LOCATION: LOCATION: HEAD

## 2023-10-07 NOTE — PROGRESS NOTES
Occupational Therapy  10/7/2023    Attempted to see patient for OT intervention. Per PT, she was orthostatic and symptomatic with transfers. BP dropped to 80's/50's. OT will follow up as appropriate. Thank you.      Lars Anderson OTROSIO/MARIBELL

## 2023-10-07 NOTE — PROGRESS NOTES
POD 2  Afeb   Orthostatic with PT  Hemovac 130cc   Hgb 8.7 (yesterday 8.7)  Alert  Good strength LE  Dressing C/D/I  S/P: L4/5 and L5/S1 decompression and fusion  IVF for episode of orthostasis  D/c hemovac later today

## 2023-10-08 PROCEDURE — 6370000000 HC RX 637 (ALT 250 FOR IP): Performed by: NEUROLOGICAL SURGERY

## 2023-10-08 PROCEDURE — 97530 THERAPEUTIC ACTIVITIES: CPT

## 2023-10-08 PROCEDURE — 6370000000 HC RX 637 (ALT 250 FOR IP): Performed by: PHYSICIAN ASSISTANT

## 2023-10-08 PROCEDURE — 2580000003 HC RX 258: Performed by: NEUROLOGICAL SURGERY

## 2023-10-08 PROCEDURE — 1100000000 HC RM PRIVATE

## 2023-10-08 PROCEDURE — 97116 GAIT TRAINING THERAPY: CPT

## 2023-10-08 PROCEDURE — 97535 SELF CARE MNGMENT TRAINING: CPT

## 2023-10-08 RX ADMIN — SODIUM CHLORIDE, PRESERVATIVE FREE 10 ML: 5 INJECTION INTRAVENOUS at 08:36

## 2023-10-08 RX ADMIN — ACETAMINOPHEN 650 MG: 325 TABLET ORAL at 08:24

## 2023-10-08 RX ADMIN — SODIUM CHLORIDE, PRESERVATIVE FREE 10 ML: 5 INJECTION INTRAVENOUS at 21:23

## 2023-10-08 RX ADMIN — VENLAFAXINE HYDROCHLORIDE 150 MG: 150 CAPSULE, EXTENDED RELEASE ORAL at 08:23

## 2023-10-08 RX ADMIN — SENNOSIDES AND DOCUSATE SODIUM 1 TABLET: 50; 8.6 TABLET ORAL at 08:23

## 2023-10-08 RX ADMIN — MAGNESIUM HYDROXIDE 30 ML: 400 SUSPENSION ORAL at 08:24

## 2023-10-08 RX ADMIN — OXYCODONE HYDROCHLORIDE 10 MG: 5 TABLET ORAL at 08:24

## 2023-10-08 ASSESSMENT — PAIN SCALES - GENERAL
PAINLEVEL_OUTOF10: 4
PAINLEVEL_OUTOF10: 5

## 2023-10-08 NOTE — PROGRESS NOTES
POD 3  Afeb   BP improved - wnl  Hemovac 320cc yesterday, 60cc today, 60cc overnight  Alert  Good strength LE  Dressing C/D/I  S/P: L4/5 and L5/S1 decompression and fusion  Orthostasis resolved  D/c hemovac today  Awaiting BM  Maybe home tomorrow

## 2023-10-09 VITALS
DIASTOLIC BLOOD PRESSURE: 71 MMHG | TEMPERATURE: 99 F | SYSTOLIC BLOOD PRESSURE: 108 MMHG | WEIGHT: 158 LBS | OXYGEN SATURATION: 100 % | RESPIRATION RATE: 20 BRPM | HEIGHT: 64 IN | BODY MASS INDEX: 26.98 KG/M2 | HEART RATE: 82 BPM

## 2023-10-09 PROCEDURE — 6370000000 HC RX 637 (ALT 250 FOR IP): Performed by: PHYSICIAN ASSISTANT

## 2023-10-09 PROCEDURE — 97116 GAIT TRAINING THERAPY: CPT

## 2023-10-09 PROCEDURE — 2580000003 HC RX 258: Performed by: NEUROLOGICAL SURGERY

## 2023-10-09 PROCEDURE — 99024 POSTOP FOLLOW-UP VISIT: CPT | Performed by: NURSE PRACTITIONER

## 2023-10-09 PROCEDURE — 6370000000 HC RX 637 (ALT 250 FOR IP): Performed by: NEUROLOGICAL SURGERY

## 2023-10-09 RX ADMIN — VENLAFAXINE HYDROCHLORIDE 150 MG: 150 CAPSULE, EXTENDED RELEASE ORAL at 08:57

## 2023-10-09 RX ADMIN — ACETAMINOPHEN 650 MG: 325 TABLET ORAL at 08:57

## 2023-10-09 RX ADMIN — SODIUM CHLORIDE, PRESERVATIVE FREE 10 ML: 5 INJECTION INTRAVENOUS at 08:59

## 2023-10-09 RX ADMIN — MAGNESIUM HYDROXIDE 30 ML: 400 SUSPENSION ORAL at 08:56

## 2023-10-09 RX ADMIN — ACETAMINOPHEN 650 MG: 325 TABLET ORAL at 04:54

## 2023-10-09 RX ADMIN — OXYCODONE HYDROCHLORIDE 5 MG: 5 TABLET ORAL at 08:57

## 2023-10-09 RX ADMIN — SENNOSIDES AND DOCUSATE SODIUM 1 TABLET: 50; 8.6 TABLET ORAL at 08:56

## 2023-10-09 ASSESSMENT — PAIN DESCRIPTION - LOCATION
LOCATION: BACK
LOCATION: BACK

## 2023-10-09 ASSESSMENT — PAIN - FUNCTIONAL ASSESSMENT: PAIN_FUNCTIONAL_ASSESSMENT: ACTIVITIES ARE NOT PREVENTED

## 2023-10-09 ASSESSMENT — PAIN DESCRIPTION - DESCRIPTORS
DESCRIPTORS: ACHING
DESCRIPTORS: ACHING

## 2023-10-09 ASSESSMENT — PAIN DESCRIPTION - ORIENTATION
ORIENTATION: LOWER
ORIENTATION: MID;LOWER

## 2023-10-09 ASSESSMENT — PAIN SCALES - GENERAL
PAINLEVEL_OUTOF10: 3
PAINLEVEL_OUTOF10: 5
PAINLEVEL_OUTOF10: 0
PAINLEVEL_OUTOF10: 7
PAINLEVEL_OUTOF10: 3

## 2023-10-09 NOTE — CARE COORDINATION
Transition of Care Plan:    RUR: 6%   Prior Level of Functioning: Independent    Disposition: Home with Family Assistance and [address]; [address] [place] [address] Phone: [phone]  AT [address] [facility] [address] Phone: [phone]    [facility] Phone: [phone] [address] Phone: [phone] [address] [facility] Phone: [phone] [address] ([address]) Phone: [phone] Skilled - (formerly [facility] [address] [place] [address]) Phone: [phone]  [address] [facility] Phone: [phone]    [address] [facility] Phone: [phone]    [address] [facility] Phone: [phone]   [address] [facility]: [phone]  [address] [facility] Phone: [phone]    Follow up appointments:   DME needed: RW @ Bedside   Transportation at discharge: Family   IM/IMM Medicare/ letter given: N/A   Caregiver Contact: Katty Huff (Child)   [phone]   Discharge Caregiver contacted prior to discharge? N/A   Care Conference needed? No   Barriers to discharge: [facility]; PA and pt informed;  The pt reported that she is ok with discharging without [facility]

## 2023-10-09 NOTE — DISCHARGE SUMMARY
Discharge Summary     Patient ID:  Shazia Mauricio  [id number]   58 y.o.  1960    Admit date: 10/5/2023    Discharge Date: 10/9/2023      Admitting Physician: Carlota Galvan MD     Discharge Physician: OSMEL Brooks NP    Admission Diagnoses: Instability of lumbosacral joint [M53.2X7]  Spinal stenosis of lumbar region, unspecified whether neurogenic claudication present [M48.061]  Spondylolisthesis [M43.10]    Last Procedure: Procedure(s):  L4-5, L5-S1 DECOMPRESSION AND FUSION, L4-5, L5-S1 POSTERIOR LUMBAR INTERBODY FUSION (O-ARM) (EIP 1100)    Discharge Diagnoses: Principal Problem:    Spondylolisthesis  Resolved Problems:    * No resolved hospital problems. *       Consults: none    Significant Diagnostic Studies: None    Patient condition upon discharge: Stable    Hospital Course:   Ms. Memo Hess is a 79-year-old woman who underwent an L4-5 diskectomy last year in [place]. She did well for approximately 6 months and then her symptoms returned. She failed all forms of conservative treatment. She went to another physician who recommended repeat surgery. She had surgery at L4-5 and L5-S1 on the left. She developed postoperative seroma and she also developed skin irritation to the glue that was used, and she had no improvement in her symptoms. She has had difficulty standing or sitting secondary to radicular pain in her left lower extremity. Imaging studies revealed severe collapse of the disk spaces at L4-5 and L5-S1 with significant neural foraminal narrowing, particularly at L4-5 and L5-S1 on the left, in part due to disk bulging and loss of disk and foramen space height. Risks and benefits of surgical decompression and fusion were discussed, she understood these. Pt underwent L4-S1 laminectomy and fusion on 10/5/23; pt tolerated procedure well. Intraoperative findings can be found in Dr. Valerie Townsend's operative report. Post-operatively, the patient transferred to the spine floor.  Her left

## 2023-10-09 NOTE — PROGRESS NOTES
Discharge instructions reviewed with patient. Opportunity for questions and clarification provided. Patient leaving with walker, ED hose, and incisional dressing to private residence.  No changes in assessment upon discharge

## 2023-10-10 RX ORDER — CEPHALEXIN 500 MG/1
500 CAPSULE ORAL 4 TIMES DAILY
Qty: 40 CAPSULE | Refills: 0 | Status: SHIPPED | OUTPATIENT
Start: 2023-10-10 | End: 2023-10-20

## 2023-10-24 PROBLEM — Z98.890 S/P COLONOSCOPY WITH POLYPECTOMY: Status: ACTIVE | Noted: 2023-10-24

## 2023-10-25 ENCOUNTER — OFFICE VISIT (OUTPATIENT)
Age: 63
End: 2023-10-25
Payer: COMMERCIAL

## 2023-10-25 VITALS
HEART RATE: 73 BPM | BODY MASS INDEX: 27.49 KG/M2 | HEIGHT: 64 IN | OXYGEN SATURATION: 96 % | TEMPERATURE: 98.6 F | DIASTOLIC BLOOD PRESSURE: 64 MMHG | SYSTOLIC BLOOD PRESSURE: 96 MMHG | WEIGHT: 161 LBS

## 2023-10-25 DIAGNOSIS — Z13.220 SCREENING FOR HYPERLIPIDEMIA: ICD-10-CM

## 2023-10-25 DIAGNOSIS — Z13.1 SCREENING FOR DIABETES MELLITUS (DM): ICD-10-CM

## 2023-10-25 DIAGNOSIS — Z76.89 ENCOUNTER TO ESTABLISH CARE: ICD-10-CM

## 2023-10-25 DIAGNOSIS — F41.1 GENERALIZED ANXIETY DISORDER: Primary | ICD-10-CM

## 2023-10-25 DIAGNOSIS — M43.16 SPONDYLOLISTHESIS OF LUMBAR REGION: ICD-10-CM

## 2023-10-25 DIAGNOSIS — D64.9 POSTOPERATIVE ANEMIA: ICD-10-CM

## 2023-10-25 PROCEDURE — G8419 CALC BMI OUT NRM PARAM NOF/U: HCPCS | Performed by: FAMILY MEDICINE

## 2023-10-25 PROCEDURE — 90674 CCIIV4 VAC NO PRSV 0.5 ML IM: CPT | Performed by: FAMILY MEDICINE

## 2023-10-25 PROCEDURE — G8482 FLU IMMUNIZE ORDER/ADMIN: HCPCS | Performed by: FAMILY MEDICINE

## 2023-10-25 PROCEDURE — 99203 OFFICE O/P NEW LOW 30 MIN: CPT | Performed by: FAMILY MEDICINE

## 2023-10-25 PROCEDURE — 3017F COLORECTAL CA SCREEN DOC REV: CPT | Performed by: FAMILY MEDICINE

## 2023-10-25 PROCEDURE — G8427 DOCREV CUR MEDS BY ELIG CLIN: HCPCS | Performed by: FAMILY MEDICINE

## 2023-10-25 PROCEDURE — 1036F TOBACCO NON-USER: CPT | Performed by: FAMILY MEDICINE

## 2023-10-25 PROCEDURE — 90471 IMMUNIZATION ADMIN: CPT | Performed by: FAMILY MEDICINE

## 2023-10-25 PROCEDURE — 1111F DSCHRG MED/CURRENT MED MERGE: CPT | Performed by: FAMILY MEDICINE

## 2023-10-25 RX ORDER — VENLAFAXINE HYDROCHLORIDE 150 MG/1
150 CAPSULE, EXTENDED RELEASE ORAL DAILY
Qty: 90 CAPSULE | Refills: 3 | Status: SHIPPED | OUTPATIENT
Start: 2023-10-25

## 2023-10-25 RX ORDER — ANTACID TABLETS 648 MG/1
648 TABLET, CHEWABLE ORAL DAILY
COMMUNITY

## 2023-10-25 RX ORDER — CHOLECALCIFEROL (VITAMIN D3) 1250 MCG
CAPSULE ORAL
COMMUNITY

## 2023-10-25 RX ORDER — ASCORBIC ACID 100 MG
1 TABLET,CHEWABLE ORAL
COMMUNITY

## 2023-10-25 RX ORDER — LIDOCAINE 4 G/G
1 PATCH TOPICAL DAILY PRN
Qty: 30 EACH | Refills: 2 | Status: SHIPPED | OUTPATIENT
Start: 2023-10-25

## 2023-10-25 RX ORDER — LIDOCAINE 4 G/G
1 PATCH TOPICAL DAILY
COMMUNITY
End: 2023-10-25 | Stop reason: SDUPTHER

## 2023-10-25 RX ORDER — FOLIC ACID 1 MG/1
TABLET ORAL
COMMUNITY

## 2023-10-25 NOTE — PROGRESS NOTES
Subjective:   Ramirez Bolden is an 58 y.o. female who presents to establish care. The patient is new to me and to [facility] practice. HPI  Chief Complaint   Patient presents with    [address] [place] [address] from [place] last month   Here to establish care - records release signed  Post spinal fusion still in pain taking tylenol   Hx: Hysterectomy 17 years ago  Mammogram due next year  Colonoscopy 4 years ago - normal  Flu shot today     Acute concerns:  -No acute concerns    Chronic medical conditions: 1. Chronic low back pain 2/2 spondylolisthesis. She is following with orthopedic doctor(Dr. Mariluz Landry). She recently underwent a surgery. Pain is well controlled right now, she does not take any medications. 2.Anxiety  Stable on Effexor. No SI/HI    Social:  Tobacco: Social smoking for several   ETOH: 2 glasses of wine a week. No illicit drug use. Allergies - reviewed: Allergies   Allergen Reactions    Latex Rash     SEVERE RASH WITH EXCORIATION     Nickel Rash     SEVERE RASH WITH EXCORIATION    Other Rash     METAL- SEVERE RASH WITH EXCORIATION         Medications - reviewed:  Current Outpatient Medications   Medication Sig    calcium carbonate 648 MG TABS Take 2 tablets by mouth daily    folic acid (FOLVITE) 1 MG tablet Take by mouth    Multiple Vitamin (QUINTABS) TABS Take 1 tablet by mouth    Cholecalciferol (VITAMIN D3) 1.25 MG (87260 UT) CAPS Take by mouth    lidocaine (HM LIDOCAINE PATCH) 4 % external patch Place 1 patch onto the skin daily as needed (Pain)    venlafaxine (EFFEXOR XR) 150 MG extended release capsule Take 1 capsule by mouth daily     No current facility-administered medications for this visit.          Past Medical History - reviewed:  Past Medical History:   Diagnosis Date    Anxiety     Arthritis     FINGERS, AND WRIST    History of blood transfusion     40 YEARS AGO    Stress          Past Surgical History - reviewed:  Past Surgical History:   Procedure Laterality Date    BACK

## 2023-10-26 LAB
CHOLEST SERPL-MCNC: 197 MG/DL
ERYTHROCYTE [DISTWIDTH] IN BLOOD BY AUTOMATED COUNT: 13.5 % (ref 11.5–14.5)
EST. AVERAGE GLUCOSE BLD GHB EST-MCNC: 88 MG/DL
HBA1C MFR BLD: 4.7 % (ref 4–5.6)
HCT VFR BLD AUTO: 32.5 % (ref 35–47)
HDLC SERPL-MCNC: 85 MG/DL
HDLC SERPL: 2.3 (ref 0–5)
HGB BLD-MCNC: 10 G/DL (ref 11.5–16)
LDLC SERPL CALC-MCNC: 98.6 MG/DL (ref 0–100)
MCH RBC QN AUTO: 26.4 PG (ref 26–34)
MCHC RBC AUTO-ENTMCNC: 30.8 G/DL (ref 30–36.5)
MCV RBC AUTO: 85.8 FL (ref 80–99)
NRBC # BLD: 0 K/UL (ref 0–0.01)
NRBC BLD-RTO: 0 PER 100 WBC
PLATELET # BLD AUTO: 440 K/UL (ref 150–400)
PMV BLD AUTO: 9.7 FL (ref 8.9–12.9)
RBC # BLD AUTO: 3.79 M/UL (ref 3.8–5.2)
TRIGL SERPL-MCNC: 67 MG/DL
VLDLC SERPL CALC-MCNC: 13.4 MG/DL
WBC # BLD AUTO: 5.2 K/UL (ref 3.6–11)

## 2023-12-28 ENCOUNTER — OFFICE VISIT (OUTPATIENT)
Age: 63
End: 2023-12-28

## 2023-12-28 VITALS
SYSTOLIC BLOOD PRESSURE: 140 MMHG | DIASTOLIC BLOOD PRESSURE: 92 MMHG | HEART RATE: 77 BPM | RESPIRATION RATE: 18 BRPM | WEIGHT: 160 LBS | OXYGEN SATURATION: 100 % | TEMPERATURE: 98.5 F | BODY MASS INDEX: 27.46 KG/M2

## 2023-12-28 DIAGNOSIS — R05.9 COUGH, UNSPECIFIED TYPE: Primary | ICD-10-CM

## 2023-12-28 DIAGNOSIS — J20.9 ACUTE BRONCHITIS, UNSPECIFIED ORGANISM: ICD-10-CM

## 2023-12-28 LAB
INFLUENZA A ANTIGEN, POC: NEGATIVE
INFLUENZA B ANTIGEN, POC: NEGATIVE
Lab: NORMAL
QC PASS/FAIL: NORMAL
SARS-COV-2 RDRP RESP QL NAA+PROBE: NEGATIVE
VALID INTERNAL CONTROL, POC: NORMAL

## 2023-12-28 RX ORDER — BENZONATATE 200 MG/1
200 CAPSULE ORAL 3 TIMES DAILY PRN
Qty: 30 CAPSULE | Refills: 0 | Status: SHIPPED | OUTPATIENT
Start: 2023-12-28

## 2023-12-28 RX ORDER — AZITHROMYCIN 250 MG/1
250 TABLET, FILM COATED ORAL SEE ADMIN INSTRUCTIONS
Qty: 6 TABLET | Refills: 0 | Status: SHIPPED | OUTPATIENT
Start: 2023-12-28 | End: 2024-01-02

## 2024-03-18 ENCOUNTER — HOSPITAL ENCOUNTER (OUTPATIENT)
Facility: HOSPITAL | Age: 64
Setting detail: RECURRING SERIES
Discharge: HOME | End: 2024-03-21
Payer: MEDICARE

## 2024-03-18 PROCEDURE — 97110 THERAPEUTIC EXERCISES: CPT

## 2024-03-18 PROCEDURE — 97162 PT EVAL MOD COMPLEX 30 MIN: CPT

## 2024-03-18 NOTE — THERAPY EVALUATION
flexion  To knees P!    Extension  25%  stretch   SB right  50%    SB left  25%     Rotation right  50%     Rotation left  25%  P!          [place]ro[place]:      Compression: [x] +    [] -     Gapping:  [x] +    [] -     Thigh Thrust: [x] R    [x] L    [x] +    [] -           Hip:  R  L  Flexion:   90  90 p!    Extension:   5  5  External Rotation:  35  35  Internal Rotation:  15  20        Flexibility:     R L    Iliopsoas  Limited BL         Special Tests    Lumbar:                 Lumbar Distraction:   [x] Pos  [] Neg    Quadrant:  [x] Pos  [] Neg   [] Flex  [x] Ex         Other: Shelia's: [x] R    [x] L    [x] +    [] -     Lalito: [x] R    [x] L    [x] +    [] -     Hamstrings 90/90:      Accessory Mobility: Hypomobile Tsp       Palpation/Trigger Point Assessment: TTP BL QL, lumbar paraspinals            Objective/Functional Outcome Measure:  FOTO Score: 46  FOTO score = an established functional score where 100 = no disability      30 min [x]Eval - untimed                        Therapeutic Procedures:  Tx Min Billable or 1:1 Min (if diff from Tx Min) Procedure, Rationale, Specifics   30  16511 Therapeutic Exercise (timed):  increase ROM, strength, coordination, balance, and proprioception to improve patient's ability to progress to PLOF and address remaining functional goals. (see flow sheet as applicable)    Details if applicable:     30     Total Total         [x]  Patient Education billed concurrently with other procedures   [x] Review HEP    [] Progressed/Changed HEP, detail:    [] Other detail:         Pain Level at end of session (0-10 scale): \"looser\"     Plan of Care / Statement of Necessity for Physical Therapy Services     Assessment / key information:  Mrs. Ramirez is a 63 year old female s/p Lumbar fusion Oct 2023 c/o chronic lower back pain that has been worsening over the last few months as she has returned to normal activities following surgery. Pt is presenting with symptoms consistent with

## 2024-04-01 ENCOUNTER — HOSPITAL ENCOUNTER (OUTPATIENT)
Facility: HOSPITAL | Age: 64
Setting detail: RECURRING SERIES
Discharge: HOME | End: 2024-04-04
Payer: MEDICARE

## 2024-04-01 PROCEDURE — 97112 NEUROMUSCULAR REEDUCATION: CPT

## 2024-04-01 PROCEDURE — 97110 THERAPEUTIC EXERCISES: CPT

## 2024-04-01 NOTE — PROGRESS NOTES
PHYSICAL THERAPY - MEDICARE DAILY TREATMENT NOTE (updated 3/23)      Date: 2024          Patient Name:  Daisy Ramirez :  1960   Medical   Diagnosis:  Other low back pain [M54.59] Treatment Diagnosis:  M54.32  SCIATICA, LEFT SIDE, M54.59  OTHER LOWER BACK PAIN, and M54.59, G89.29  CHRONIC LOWER BACK PAIN R26.89   Abnormalities of gait and mobility     Referral Source:  Gill Townsend MD Insurance:   Payor: MEDICARE / Plan: MEDICARE PART A AND B / Product Type: *No Product type* /                     Patient  verified yes     Visit #   Current  / Total 2 24   Time   In / Out 1:00p 1:45p   Total Treatment Time 45   Total Timed Codes 45   1:1 Treatment Time 45       BC Totals Reminder:  bill using total billable   min of TIMED therapeutic procedures and modalities.   8-22 min = 1 unit; 23-37 min = 2 units; 38-52 min = 3 units; 53-67 min = 4 units; 68-82 min = 5 units            SUBJECTIVE    Pain Level (0-10 scale): 0    Any medication changes, allergies to medications, adverse drug reactions, diagnosis change, or new procedure performed?: [x] No    [] Yes (see summary sheet for update)  Medications: Verified on Patient Summary List    Subjective functional status/changes:     Patient reporting pain when she was traveling to [place] over the weekend. Noting pain when she was playing with her grandchildren on the floor and when she was driving. No lasting pain following her drive or activities over the weekend.     OBJECTIVE      Therapeutic Procedures:  Tx Min Billable or 1:1 Min (if diff from Tx Min) Procedure, Rationale, Specifics   30  63114 Therapeutic Exercise (timed):  increase ROM, strength, coordination, balance, and proprioception to improve patient's ability to progress to PLOF and address remaining functional goals. (see flow sheet as applicable)     Details if applicable:     15  28119 Neuromuscular Re-Education (timed):  improve balance, coordination, kinesthetic sense, posture, core

## 2024-04-03 ENCOUNTER — HOSPITAL ENCOUNTER (OUTPATIENT)
Facility: HOSPITAL | Age: 64
Setting detail: RECURRING SERIES
Discharge: HOME | End: 2024-04-06
Payer: MEDICARE

## 2024-04-03 PROCEDURE — 97112 NEUROMUSCULAR REEDUCATION: CPT

## 2024-04-03 PROCEDURE — 97110 THERAPEUTIC EXERCISES: CPT

## 2024-04-03 NOTE — PROGRESS NOTES
PHYSICAL THERAPY - MEDICARE DAILY TREATMENT NOTE (updated 3/23)      Date: 4/3/2024          Patient Name:  Daisy Ramirez :  1960   Medical   Diagnosis:  Other low back pain [M54.59] Treatment Diagnosis:  M54.32  SCIATICA, LEFT SIDE, M54.59  OTHER LOWER BACK PAIN, and M54.59, G89.29  CHRONIC LOWER BACK PAIN R26.89   Abnormalities of gait and mobility     Referral Source:  Gill Townsend MD Insurance:   Payor: MEDICARE / Plan: MEDICARE PART A AND B / Product Type: *No Product type* /                     Patient  verified yes     Visit #   Current  / Total 3 24   Time   In / Out 1130a 1215p   Total Treatment Time 45   Total Timed Codes 45   1:1 Treatment Time 45       BC Totals Reminder:  bill using total billable   min of TIMED therapeutic procedures and modalities.   8-22 min = 1 unit; 23-37 min = 2 units; 38-52 min = 3 units; 53-67 min = 4 units; 68-82 min = 5 units            SUBJECTIVE    Pain Level (0-10 scale): 0    Any medication changes, allergies to medications, adverse drug reactions, diagnosis change, or new procedure performed?: [x] No    [] Yes (see summary sheet for update)  Medications: Verified on Patient Summary List    Subjective functional status/changes:     Patient reporting no pain today and overall feeling better since starting PT.     OBJECTIVE      Therapeutic Procedures:  Tx Min Billable or 1:1 Min (if diff from Tx Min) Procedure, Rationale, Specifics   30  70755 Therapeutic Exercise (timed):  increase ROM, strength, coordination, balance, and proprioception to improve patient's ability to progress to PLOF and address remaining functional goals. (see flow sheet as applicable)     Details if applicable:     15  11643 Neuromuscular Re-Education (timed):  improve balance, coordination, kinesthetic sense, posture, core stability and proprioception to improve patient's ability to develop conscious control of individual muscles and awareness of position of extremities in order to

## 2024-04-15 ENCOUNTER — HOSPITAL ENCOUNTER (OUTPATIENT)
Facility: HOSPITAL | Age: 64
Setting detail: RECURRING SERIES
Discharge: HOME | End: 2024-04-18
Payer: MEDICARE

## 2024-04-15 PROCEDURE — 97110 THERAPEUTIC EXERCISES: CPT

## 2024-04-15 PROCEDURE — 97112 NEUROMUSCULAR REEDUCATION: CPT

## 2024-04-15 NOTE — PROGRESS NOTES
extremities in order to progress to PLOF and address remaining functional goals. (see flow sheet as applicable)     Details if applicable:     45     Total Total       [x]  Patient Education billed concurrently with other procedures   [x] Review HEP    [] Progressed/Changed HEP, detail:    [] Other detail:         Other Objective/Functional Measures  No increase in pain with today's session    Pain Level at end of session (0-10 scale): 0      Assessment     Patient will continue to benefit from skilled PT / OT services to modify and progress therapeutic interventions, analyze and address functional mobility deficits, analyze and address ROM deficits, analyze and address strength deficits, analyze and address soft tissue restrictions, analyze and cue for proper movement patterns, and instruct in home and community integration to address functional deficits and attain remaining goals.    Progress toward goals / Updated goals:  []  See Progress Note/Recertification  Educated on gym progressions and if patient has pain to lower resistance down and if continued pain, to stop. Perform stretches with pain occurs with certain activities.    Short Term Goals: To be accomplished in 12 treatments.  Patient will be ind with Hep without VC to reduce pain to 2/10 throughout the day   Patient will be able to demonstrate improvement in LE strength to 4/5 in order to perform sit <> stand without pain   Pt will be able to perform supine <> sit without pain and good body mechanics to reduce stress through irritated tissue  Patient will be able to demonstrate standing lumbar flexion to shins to reach times on lower shelf without pain  Long Term Goals: To be accomplished in  24 treatments.  Patient will be able to 5x sit <> stand without HHA, pain, or excessive compensation in order to improve mobility with ADLs  Patient will be able to demonstrate improvement in LE strength to 5/5 in order to walk for 2 hours without pain to improve

## 2024-04-17 ENCOUNTER — HOSPITAL ENCOUNTER (OUTPATIENT)
Facility: HOSPITAL | Age: 64
Setting detail: RECURRING SERIES
Discharge: HOME | End: 2024-04-20
Payer: MEDICARE

## 2024-04-17 PROCEDURE — 97112 NEUROMUSCULAR REEDUCATION: CPT

## 2024-04-17 PROCEDURE — 97110 THERAPEUTIC EXERCISES: CPT

## 2024-04-17 NOTE — PROGRESS NOTES
PHYSICAL THERAPY - MEDICARE DAILY TREATMENT NOTE (updated 3/23)      Date: 2024          Patient Name:  Daisy Ramirez :  1960   Medical   Diagnosis:  Other low back pain [M54.59] Treatment Diagnosis:  M54.32  SCIATICA, LEFT SIDE, M54.59  OTHER LOWER BACK PAIN, and M54.59, G89.29  CHRONIC LOWER BACK PAIN R26.89   Abnormalities of gait and mobility     Referral Source:  Gill Townsend MD Insurance:   Payor: MEDICARE / Plan: MEDICARE PART A AND B / Product Type: *No Product type* /                     Patient  verified yes     Visit #   Current  / Total 5 24   Time   In / Out 1:00p 1:40p   Total Treatment Time 40   Total Timed Codes 40   1:1 Treatment Time 40       BC Totals Reminder:  bill using total billable   min of TIMED therapeutic procedures and modalities.   8-22 min = 1 unit; 23-37 min = 2 units; 38-52 min = 3 units; 53-67 min = 4 units; 68-82 min = 5 units            SUBJECTIVE    Pain Level (0-10 scale): 3    Any medication changes, allergies to medications, adverse drug reactions, diagnosis change, or new procedure performed?: [x] No    [] Yes (see summary sheet for update)  Medications: Verified on Patient Summary List    Subjective functional status/changes:     Patient reporting some lower back pain after doing some house cleaning where she has to reach to the ground. Noting some soreness in the R lower back more than the left      OBJECTIVE      Therapeutic Procedures:  Tx Min Billable or 1:1 Min (if diff from Tx Min) Procedure, Rationale, Specifics   30  38918 Therapeutic Exercise (timed):  increase ROM, strength, coordination, balance, and proprioception to improve patient's ability to progress to PLOF and address remaining functional goals. (see flow sheet as applicable)     Details if applicable:     15  10400 Neuromuscular Re-Education (timed):  improve balance, coordination, kinesthetic sense, posture, core stability and proprioception to improve patient's ability to develop

## 2024-04-22 ENCOUNTER — HOSPITAL ENCOUNTER (OUTPATIENT)
Facility: HOSPITAL | Age: 64
Setting detail: RECURRING SERIES
Discharge: HOME | End: 2024-04-25
Payer: MEDICARE

## 2024-04-22 PROCEDURE — 97110 THERAPEUTIC EXERCISES: CPT

## 2024-04-22 PROCEDURE — 97112 NEUROMUSCULAR REEDUCATION: CPT

## 2024-04-23 NOTE — PROGRESS NOTES
Physical Therapy at [facility],   a part of [address], [address]  [place], [place] [address]  Phone: [phone]  Fax: [phone]  PHYSICAL THERAPY PROGRESS NOTE  Patient Name:  Daisy Ramirez :  1960   Treatment/Medical Diagnosis: Other low back pain [M54.59]   Referral Source:  Gill Townsend MD     Date of Initial Visit:  3/18/2024 Attended Visits:  6 Missed Visits:  0     SUMMARY OF TREATMENT/ASSESSMENT:  86300 Therapeutic Exercise, 88901 Neuromuscular Re-Education, 82599 Manual Therapy, 72157 Therapeutic Activity,     Patient has met 3/4 STG and progressing very well towards LTG. Patient continues to be limited in lumbar flexion without pain and difficulty with prolong standing and walking. Patient would benefit from continue therapy 2 x week for 4-6 weeks to reach LTG.     CURRENT STATUS/GOALS    Short Term Goals: To be accomplished in 12 treatments.  Patient will be ind with Hep without VC to reduce pain to 2/10 throughout the day MET  Patient will be able to demonstrate improvement in LE strength to 4/5 in order to perform sit <> stand without pain MET  Pt will be able to perform supine <> sit without pain and good body mechanics to reduce stress through irritated tissue MET  Patient will be able to demonstrate standing lumbar flexion to shins to reach times on lower shelf without pain  Long Term Goals: To be accomplished in  24 treatments.  Patient will be able to 5x sit <> stand without HHA, pain, or excessive compensation in order to improve mobility with ADLs MET  Patient will be able to demonstrate improvement in LE strength to 5/5 in order to walk for 2 hours without pain to improve maintain cardiovascular health    Pt will be able to lift 5# from the ground in order to do laundry without pain   FOTO > MCID to demonstrate improvement in tolerance towards ADLs        RECOMMENDATIONS FOR SKILLED THERAPY  Cont per AMBER Cage 
with ADLs MET  Patient will be able to demonstrate improvement in LE strength to 5/5 in order to walk for 2 hours without pain to improve maintain cardiovascular health    Pt will be able to lift 5# from the ground in order to do laundry without pain   FOTO > MCID to demonstrate improvement in tolerance towards ADLs      PLAN  Yes  Continue plan of care  Certification Period: 3/18/2024-06/16/24   [x]  Upgrade activities as tolerated  []  Discharge due to:  []  Other:      ALBINO JACKSON, JACK       4/22/2024       11:38 AM

## 2024-04-24 ENCOUNTER — HOSPITAL ENCOUNTER (OUTPATIENT)
Facility: HOSPITAL | Age: 64
Setting detail: RECURRING SERIES
Discharge: HOME | End: 2024-04-27
Payer: MEDICARE

## 2024-04-24 PROCEDURE — 97110 THERAPEUTIC EXERCISES: CPT

## 2024-04-24 PROCEDURE — 97112 NEUROMUSCULAR REEDUCATION: CPT

## 2024-04-24 NOTE — PROGRESS NOTES
pain, or excessive compensation in order to improve mobility with ADLs MET  Patient will be able to demonstrate improvement in LE strength to 5/5 in order to walk for 2 hours without pain to improve maintain cardiovascular health    Pt will be able to lift 5# from the ground in order to do laundry without pain   FOTO > MCID to demonstrate improvement in tolerance towards ADLs      PLAN  Yes  Continue plan of care  Certification Period: 3/18/2024-06/16/24   [x]  Upgrade activities as tolerated  []  Discharge due to:  []  Other:      Niles Chan, PT       4/24/2024       11:35 AM

## 2024-04-29 ENCOUNTER — APPOINTMENT (OUTPATIENT)
Facility: HOSPITAL | Age: 64
End: 2024-04-29
Payer: MEDICARE

## 2024-05-03 DIAGNOSIS — J20.9 ACUTE BRONCHITIS, UNSPECIFIED ORGANISM: ICD-10-CM

## 2024-05-03 DIAGNOSIS — F41.1 GENERALIZED ANXIETY DISORDER: ICD-10-CM

## 2024-05-03 RX ORDER — LIDOCAINE 4 G/G
1 PATCH TOPICAL DAILY PRN
Qty: 30 EACH | Refills: 2 | Status: SHIPPED | OUTPATIENT
Start: 2024-05-03

## 2024-05-03 RX ORDER — VENLAFAXINE HYDROCHLORIDE 150 MG/1
150 CAPSULE, EXTENDED RELEASE ORAL DAILY
Qty: 90 CAPSULE | Refills: 3 | Status: SHIPPED | OUTPATIENT
Start: 2024-05-03

## 2024-05-03 NOTE — TELEPHONE ENCOUNTER
----- Message from Gabby Chang sent at 5/3/2024  2:49 PM EDT -----  Subject: Refill Request    QUESTIONS  Name of Medication? venlafaxine (EFFEXOR XR) 150 MG extended release   capsule  Patient-reported dosage and instructions? 150 MG 1 daily   How many days do you have left? 3  Preferred Pharmacy? [facility] #[id number]  Pharmacy phone number (if available)? [phone]  ---------------------------------------------------------------------------  --------------,  Name of Medication? lidocaine (HM LIDOCAINE PATCH) 4 % external patch  Patient-reported dosage and instructions? as needed   How many days do you have left? 3  Preferred Pharmacy? [facility] #[id number]  Pharmacy phone number (if available)? [phone]  ---------------------------------------------------------------------------  --------------  CALL BACK INFO  What is the best way for the office to contact you? OK to leave message on   voicemail  Preferred Call Back Phone Number? [phone]  ---------------------------------------------------------------------------  --------------  SCRIPT ANSWERS  Relationship to Patient? Self

## 2024-08-16 ENCOUNTER — TELEPHONE (OUTPATIENT)
Age: 64
End: 2024-08-16

## 2024-08-16 NOTE — TELEPHONE ENCOUNTER
Patient called requesting a refill on Estradiol 0.1 mg. [facility] [place] [facility] [place] [address] [facility].    Thanks!

## 2024-09-03 ENCOUNTER — TELEPHONE (OUTPATIENT)
Age: 64
End: 2024-09-03

## 2024-09-03 DIAGNOSIS — Z12.31 ENCOUNTER FOR SCREENING MAMMOGRAM FOR MALIGNANT NEOPLASM OF BREAST: Primary | ICD-10-CM

## 2024-10-02 ENCOUNTER — OFFICE VISIT (OUTPATIENT)
Age: 64
End: 2024-10-02
Payer: MEDICARE

## 2024-10-02 VITALS
DIASTOLIC BLOOD PRESSURE: 78 MMHG | HEIGHT: 64 IN | OXYGEN SATURATION: 96 % | SYSTOLIC BLOOD PRESSURE: 113 MMHG | HEART RATE: 61 BPM | RESPIRATION RATE: 18 BRPM | BODY MASS INDEX: 24.95 KG/M2 | WEIGHT: 146.16 LBS | TEMPERATURE: 98 F

## 2024-10-02 DIAGNOSIS — Z00.00 WELCOME TO MEDICARE PREVENTIVE VISIT: Primary | ICD-10-CM

## 2024-10-02 DIAGNOSIS — Z78.0 POSTMENOPAUSAL: ICD-10-CM

## 2024-10-02 DIAGNOSIS — F43.21 GRIEF: ICD-10-CM

## 2024-10-02 DIAGNOSIS — Z13.6 SCREENING FOR ISCHEMIC HEART DISEASE: ICD-10-CM

## 2024-10-02 DIAGNOSIS — F41.1 GENERALIZED ANXIETY DISORDER: ICD-10-CM

## 2024-10-02 DIAGNOSIS — N95.1 HOT FLASHES DUE TO MENOPAUSE: ICD-10-CM

## 2024-10-02 PROCEDURE — 3017F COLORECTAL CA SCREEN DOC REV: CPT | Performed by: FAMILY MEDICINE

## 2024-10-02 PROCEDURE — G0402 INITIAL PREVENTIVE EXAM: HCPCS | Performed by: FAMILY MEDICINE

## 2024-10-02 PROCEDURE — 1123F ACP DISCUSS/DSCN MKR DOCD: CPT | Performed by: FAMILY MEDICINE

## 2024-10-02 RX ORDER — ESTRADIOL 0.05 MG/D
1 PATCH, EXTENDED RELEASE TRANSDERMAL
COMMUNITY
End: 2024-10-02 | Stop reason: SDUPTHER

## 2024-10-02 RX ORDER — VENLAFAXINE HYDROCHLORIDE 150 MG/1
150 CAPSULE, EXTENDED RELEASE ORAL DAILY
Qty: 90 CAPSULE | Refills: 3 | Status: SHIPPED | OUTPATIENT
Start: 2024-10-02

## 2024-10-02 RX ORDER — DIPHENHYDRAMINE HYDROCHLORIDE 25 MG/1
1 TABLET ORAL DAILY
COMMUNITY

## 2024-10-02 RX ORDER — ESTRADIOL 0.05 MG/D
1 PATCH, EXTENDED RELEASE TRANSDERMAL
Qty: 24 PATCH | Refills: 3 | Status: SHIPPED | OUTPATIENT
Start: 2024-10-03

## 2024-10-02 SDOH — ECONOMIC STABILITY: INCOME INSECURITY: HOW HARD IS IT FOR YOU TO PAY FOR THE VERY BASICS LIKE FOOD, HOUSING, MEDICAL CARE, AND HEATING?: NOT VERY HARD

## 2024-10-02 SDOH — ECONOMIC STABILITY: FOOD INSECURITY: WITHIN THE PAST 12 MONTHS, YOU WORRIED THAT YOUR FOOD WOULD RUN OUT BEFORE YOU GOT MONEY TO BUY MORE.: NEVER TRUE

## 2024-10-02 SDOH — ECONOMIC STABILITY: FOOD INSECURITY: WITHIN THE PAST 12 MONTHS, THE FOOD YOU BOUGHT JUST DIDN'T LAST AND YOU DIDN'T HAVE MONEY TO GET MORE.: NEVER TRUE

## 2024-10-02 ASSESSMENT — PATIENT HEALTH QUESTIONNAIRE - PHQ9
4. FEELING TIRED OR HAVING LITTLE ENERGY: NEARLY EVERY DAY
SUM OF ALL RESPONSES TO PHQ QUESTIONS 1-9: 11
8. MOVING OR SPEAKING SO SLOWLY THAT OTHER PEOPLE COULD HAVE NOTICED. OR THE OPPOSITE, BEING SO FIGETY OR RESTLESS THAT YOU HAVE BEEN MOVING AROUND A LOT MORE THAN USUAL: SEVERAL DAYS
5. POOR APPETITE OR OVEREATING: NOT AT ALL
1. LITTLE INTEREST OR PLEASURE IN DOING THINGS: MORE THAN HALF THE DAYS
3. TROUBLE FALLING OR STAYING ASLEEP: SEVERAL DAYS
SUM OF ALL RESPONSES TO PHQ QUESTIONS 1-9: 11
7. TROUBLE CONCENTRATING ON THINGS, SUCH AS READING THE NEWSPAPER OR WATCHING TELEVISION: NOT AT ALL
SUM OF ALL RESPONSES TO PHQ QUESTIONS 1-9: 11
SUM OF ALL RESPONSES TO PHQ QUESTIONS 1-9: 11
2. FEELING DOWN, DEPRESSED OR HOPELESS: MORE THAN HALF THE DAYS
10. IF YOU CHECKED OFF ANY PROBLEMS, HOW DIFFICULT HAVE THESE PROBLEMS MADE IT FOR YOU TO DO YOUR WORK, TAKE CARE OF THINGS AT HOME, OR GET ALONG WITH OTHER PEOPLE: SOMEWHAT DIFFICULT
6. FEELING BAD ABOUT YOURSELF - OR THAT YOU ARE A FAILURE OR HAVE LET YOURSELF OR YOUR FAMILY DOWN: MORE THAN HALF THE DAYS
9. THOUGHTS THAT YOU WOULD BE BETTER OFF DEAD, OR OF HURTING YOURSELF: NOT AT ALL
SUM OF ALL RESPONSES TO PHQ9 QUESTIONS 1 & 2: 4

## 2024-10-02 ASSESSMENT — ANXIETY QUESTIONNAIRES
2. NOT BEING ABLE TO STOP OR CONTROL WORRYING: SEVERAL DAYS
7. FEELING AFRAID AS IF SOMETHING AWFUL MIGHT HAPPEN: SEVERAL DAYS
4. TROUBLE RELAXING: MORE THAN HALF THE DAYS
GAD7 TOTAL SCORE: 11
1. FEELING NERVOUS, ANXIOUS, OR ON EDGE: SEVERAL DAYS
3. WORRYING TOO MUCH ABOUT DIFFERENT THINGS: MORE THAN HALF THE DAYS
6. BECOMING EASILY ANNOYED OR IRRITABLE: MORE THAN HALF THE DAYS
IF YOU CHECKED OFF ANY PROBLEMS ON THIS QUESTIONNAIRE, HOW DIFFICULT HAVE THESE PROBLEMS MADE IT FOR YOU TO DO YOUR WORK, TAKE CARE OF THINGS AT HOME, OR GET ALONG WITH OTHER PEOPLE: SOMEWHAT DIFFICULT
5. BEING SO RESTLESS THAT IT IS HARD TO SIT STILL: MORE THAN HALF THE DAYS

## 2024-10-02 ASSESSMENT — LIFESTYLE VARIABLES
HAS A RELATIVE, FRIEND, DOCTOR, OR ANOTHER HEALTH PROFESSIONAL EXPRESSED CONCERN ABOUT YOUR DRINKING OR SUGGESTED YOU CUT DOWN: NO
HOW OFTEN DO YOU HAVE A DRINK CONTAINING ALCOHOL: 2-3 TIMES A WEEK
HOW MANY STANDARD DRINKS CONTAINING ALCOHOL DO YOU HAVE ON A TYPICAL DAY: 3 OR 4
HOW OFTEN DURING THE LAST YEAR HAVE YOU BEEN UNABLE TO REMEMBER WHAT HAPPENED THE NIGHT BEFORE BECAUSE YOU HAD BEEN DRINKING: NEVER
HOW OFTEN DURING THE LAST YEAR HAVE YOU HAD A FEELING OF GUILT OR REMORSE AFTER DRINKING: NEVER
HOW OFTEN DURING THE LAST YEAR HAVE YOU NEEDED AN ALCOHOLIC DRINK FIRST THING IN THE MORNING TO GET YOURSELF GOING AFTER A NIGHT OF HEAVY DRINKING: NEVER
HAVE YOU OR SOMEONE ELSE BEEN INJURED AS A RESULT OF YOUR DRINKING: NO
HOW OFTEN DURING THE LAST YEAR HAVE YOU FOUND THAT YOU WERE NOT ABLE TO STOP DRINKING ONCE YOU HAD STARTED: NEVER
HOW OFTEN DURING THE LAST YEAR HAVE YOU FAILED TO DO WHAT WAS NORMALLY EXPECTED FROM YOU BECAUSE OF DRINKING: NEVER

## 2024-10-02 NOTE — PATIENT INSTRUCTIONS
questions about a medical condition or this instruction, always ask your healthcare professional. [employer], [employer] disclaims any warranty or liability for your use of this information.      Personalized Preventive Plan for Daisy Ramirez - 10/2/2024  Medicare offers a range of preventive health benefits. Some of the tests and screenings are paid in full while other may be subject to a deductible, co-insurance, and/or copay.    Some of these benefits include a comprehensive review of your medical history including lifestyle, illnesses that may run in your family, and various assessments and screenings as appropriate.    After reviewing your medical record and screening and assessments performed today your provider may have ordered immunizations, labs, imaging, and/or referrals for you.  A list of these orders (if applicable) as well as your Preventive Care list are included within your After Visit Summary for your review.    Other Preventive Recommendations:    A preventive eye exam performed by an eye specialist is recommended every 1-2 years to screen for glaucoma; cataracts, macular degeneration, and other eye disorders.  A preventive dental visit is recommended every 6 months.  Try to get at least 150 minutes of exercise per week or 10,000 steps per day on a pedometer .  Order or download the FREE \"Exercise & Physical Activity: Your Everyday Guide\" from [employer] [place] [employer]. Call 1-316.279.9299 or search [employer] [place] [employer] online.  You need 1178-6827 mg of calcium and 7632-3390 IU of vitamin D per day. It is possible to meet your calcium requirement with diet alone, but a vitamin D supplement is usually necessary to meet this goal.  When exposed to the sun, use a sunscreen that protects against both UVA and UVB radiation with an SPF of 30 or greater. Reapply every 2 to 3 hours or after sweating, drying off with a towel, or swimming.  Always wear a seat belt when traveling in a car.

## 2024-10-02 NOTE — PROGRESS NOTES
Medicare Annual Wellness Visit    Daisy Ramirez is here for Medicare AWV    Assessment & Plan   Welcome to Medicare preventive visit  Postmenopausal  -     DEXA BONE DENSITY AXIAL SKELETON; Future  -     Basic Metabolic Panel; Future  Generalized anxiety disorder  Well controlled with the current dose with Effexor 150mg. Will continue at the same dose.   -     venlafaxine (EFFEXOR XR) 150 MG extended release capsule; Take 1 capsule by mouth daily, Disp-90 capsule, R-3Normal  Hot flashes due to menopause  Currently using Estradiol patch which does control the symptoms. She tried to stop it but the symptoms reoccur. She is s/p hysterectomy so pure estradiol is safe. Counseled the patient about the prolong use. Will consider trial of weaning off in about 6-12 months.   -     Hemoglobin and Hematocrit; Future  Grief  Doing OK overall. No SI/HI.   Screening for ischemic heart disease  -     Lipid Panel; Future    Recommendations for Preventive Services Due: see orders and patient instructions/AVS.  Recommended screening schedule for the next 5-10 years is provided to the patient in written form: see Patient Instructions/AVS.     Return in 1 year (on 10/2/2025) for Medicare AWV.     Subjective   Chief Complaint   Patient presents with    Medicare AWV     -No acute concerns  -Lost her  last month due to heart attack. Overall doing Ok, have support from the family members.     Patient's complete Health Risk Assessment and screening values have been reviewed and are found in Flowsheets. The following problems were reviewed today and where indicated follow up appointments were made and/or referrals ordered.    Positive Risk Factor Screenings with Interventions:        Depression:  PHQ-2 Score: 6  PHQ-9 Total Score: 6  Total Score Interpretation: 5-9 = mild depression  Interventions:  Patient declines any further evaluation or treatment                                 Objective   Vitals:    10/02/24 1338   BP: 113/78 
WITH EXCORIATION    Other Rash     METAL- SEVERE RASH WITH EXCORIATION    Penicillins Nausea Only     Prior to Visit Medications    Medication Sig Taking? Authorizing Provider   MAGNESIUM PO Take 1 tablet by mouth daily Yes Belem Trimble MD   Biotin 5 MG CAPS Take 1 capsule by mouth daily Yes Belem Trimble MD   venlafaxine (EFFEXOR XR) 150 MG extended release capsule Take 1 capsule by mouth daily Yes Analia Stewart MD   estradiol (VIVELLE) 0.05 MG/24HR Place 1 patch onto the skin Twice a Week Yes Analia Stewart MD   calcium carbonate 648 MG TABS Take 1 tablet by mouth daily Yes Belem Trimble MD   Multiple Vitamin (QUINTABS) TABS Take 1 tablet by mouth Yes Belem Trimble MD   Cholecalciferol (VITAMIN D3) 1.25 MG (31919 UT) CAPS Take by mouth Yes Belem Trimble MD     \"Have you been to the ER, urgent care clinic since your last visit?  Hospitalized since your last visit?\"    YES - When: approximately 10 months ago.  Where and Why: [facility] and cough.    “Have you seen or consulted any other health care providers outside our system since your last visit?”    NO    Have you had a mammogram?”   NO    Date of last Mammogram: 3/20/2023              Feels like breathing is not natural, have concerns of testing for heart disease as spouse suddenly had a heart attack and passed.    CareTeam (Including outside providers/suppliers regularly involved in providing care):   Patient Care Team:  Analia Stewart MD as PCP - General (Family Medicine)  Analia Stewart MD as PCP - Empaneled Provider      Reviewed and updated this visit:  Tobacco  Allergies  Meds  Med Hx  Surg Hx  Soc Hx  Fam Hx      I, Albina Garrido LPN, 10/2/2024, performed the documented evaluation under the direct supervision of the attending physician.

## 2024-10-03 LAB
ANION GAP SERPL CALC-SCNC: 6 MMOL/L (ref 2–12)
BUN SERPL-MCNC: 20 MG/DL (ref 6–20)
BUN/CREAT SERPL: 23 (ref 12–20)
CALCIUM SERPL-MCNC: 9.6 MG/DL (ref 8.5–10.1)
CHLORIDE SERPL-SCNC: 103 MMOL/L (ref 97–108)
CHOLEST SERPL-MCNC: 221 MG/DL
CO2 SERPL-SCNC: 28 MMOL/L (ref 21–32)
CREAT SERPL-MCNC: 0.88 MG/DL (ref 0.55–1.02)
GLUCOSE SERPL-MCNC: 88 MG/DL (ref 65–100)
HCT VFR BLD AUTO: 40 % (ref 35–47)
HDLC SERPL-MCNC: 87 MG/DL
HDLC SERPL: 2.5 (ref 0–5)
HGB BLD-MCNC: 12.8 G/DL (ref 11.5–16)
LDLC SERPL CALC-MCNC: 116.6 MG/DL (ref 0–100)
POTASSIUM SERPL-SCNC: 4.5 MMOL/L (ref 3.5–5.1)
SODIUM SERPL-SCNC: 137 MMOL/L (ref 136–145)
TRIGL SERPL-MCNC: 87 MG/DL
VLDLC SERPL CALC-MCNC: 17.4 MG/DL

## 2024-10-16 ENCOUNTER — HOSPITAL ENCOUNTER (OUTPATIENT)
Facility: HOSPITAL | Age: 64
Discharge: HOME | End: 2024-10-19
Attending: FAMILY MEDICINE
Payer: MEDICARE

## 2024-10-16 ENCOUNTER — HOSPITAL ENCOUNTER (OUTPATIENT)
Facility: HOSPITAL | Age: 64
Discharge: HOME | End: 2024-10-19
Attending: NEUROLOGICAL SURGERY
Payer: MEDICARE

## 2024-10-16 VITALS — HEIGHT: 64 IN | BODY MASS INDEX: 24.92 KG/M2 | WEIGHT: 146 LBS

## 2024-10-16 VITALS — BODY MASS INDEX: 25.61 KG/M2 | WEIGHT: 150 LBS | HEIGHT: 64 IN

## 2024-10-16 DIAGNOSIS — Z78.0 POSTMENOPAUSAL: ICD-10-CM

## 2024-10-16 DIAGNOSIS — Z12.31 ENCOUNTER FOR SCREENING MAMMOGRAM FOR MALIGNANT NEOPLASM OF BREAST: ICD-10-CM

## 2024-10-16 DIAGNOSIS — Z98.1 S/P LUMBAR FUSION: ICD-10-CM

## 2024-10-16 PROCEDURE — 77063 BREAST TOMOSYNTHESIS BI: CPT

## 2024-10-16 PROCEDURE — 6360000004 HC RX CONTRAST MEDICATION: Performed by: RADIOLOGY

## 2024-10-16 PROCEDURE — 72158 MRI LUMBAR SPINE W/O & W/DYE: CPT

## 2024-10-16 PROCEDURE — 77080 DXA BONE DENSITY AXIAL: CPT

## 2024-10-16 PROCEDURE — 77067 SCR MAMMO BI INCL CAD: CPT

## 2024-10-16 PROCEDURE — A9579 GAD-BASE MR CONTRAST NOS,1ML: HCPCS | Performed by: RADIOLOGY

## 2024-10-16 RX ADMIN — GADOTERIDOL 13 ML: 279.3 INJECTION, SOLUTION INTRAVENOUS at 16:34

## 2024-10-24 RX ORDER — ESTRADIOL 0.05 MG/D
1 PATCH, EXTENDED RELEASE TRANSDERMAL
Qty: 24 PATCH | Refills: 3 | Status: SHIPPED | OUTPATIENT
Start: 2024-10-24

## 2024-10-24 NOTE — TELEPHONE ENCOUNTER
Medication Refill Request    Daisy Ramirez is requesting a refill of the following medication(s):   Requested Prescriptions     Pending Prescriptions Disp Refills    estradiol (VIVELLE) 0.05 MG/24HR 24 patch 3     Sig: Place 1 patch onto the skin Twice a Week        Listed PCP is Analia Stewart MD   Last provider to prescribe medication: Dr. Stewart on 10/03/24  Date of Last Office Visit at [facility]: 10/02/24 with Dr. Stewart     FUTURE APPOINTMENT:   Future Appointments   Date Time Provider Department Center   11/27/2024 11:00 AM Bry Blanchard MD [facility] MAIN [facility] ECC DEP       Please send refill to:      [facility]/pharmacy #[id number] - [place] - [address] [address] - P [phone] - F [phone] [address] [address] [address]  Phone: [phone] Fax: [phone]      Please review request and approve or deny with recommendations within 48 hours.

## 2024-10-25 ENCOUNTER — TELEPHONE (OUTPATIENT)
Age: 64
End: 2024-10-25

## 2024-10-25 NOTE — TELEPHONE ENCOUNTER
Pt stated that her insurance has informed her that the Rx for Estrdiol requires a PA. She is asking that you complete the PA.    Thank you

## 2024-10-29 ENCOUNTER — TELEPHONE (OUTPATIENT)
Age: 64
End: 2024-10-29

## 2024-10-29 NOTE — TELEPHONE ENCOUNTER
I called the patient and discussed the results of the recent DEXA scan. The patient is already taking Calcium supplements, recommended to continue. Recommended weight bearing exercises. Will repeat DEXA in 2 years.    7:07 PM  10/29/2024  Analia Stewart MD

## 2025-01-03 ENCOUNTER — TELEPHONE (OUTPATIENT)
Age: 65
End: 2025-01-03

## 2025-01-03 DIAGNOSIS — F41.1 GENERALIZED ANXIETY DISORDER: ICD-10-CM

## 2025-01-03 RX ORDER — VENLAFAXINE HYDROCHLORIDE 150 MG/1
150 CAPSULE, EXTENDED RELEASE ORAL DAILY
Qty: 90 CAPSULE | Refills: 3 | OUTPATIENT
Start: 2025-01-03

## 2025-01-03 NOTE — TELEPHONE ENCOUNTER
Pt needs new refill on venlafaxine (EFFEXOR XR) 150 MG extended release capsule, Send to [facility]/pharmacy #[id number] - [place] - [address] - P [phone] - F [phone]

## 2025-01-03 NOTE — TELEPHONE ENCOUNTER
Medication Refill Request    Daisy Ramirez is requesting a refill of the following medication(s):   Requested Prescriptions     Pending Prescriptions Disp Refills    venlafaxine (EFFEXOR XR) 150 MG extended release capsule 90 capsule 3     Sig: Take 1 capsule by mouth daily        Listed PCP is Analia Stewart MD   Last provider to prescribe medication: Dr. Stewart on 10/2/24  Date of Last Office Visit at [facility]: 10/2/24 with Dr. Stewart    FUTURE APPOINTMENT:   Future Appointments   Date Time Provider Department Center   2/6/2025 12:00 PM Bry Blanchard MD [facility] MAIN [facility] ECC DEP       Please send refill to:    [facility]/pharmacy #[id number] - [place] - [address] [address] - P [phone] - F [phone] [address] [address] [address]  Phone: [phone] Fax: [phone]      Please review request and approve or deny with recommendations within 48 hours.

## 2025-01-10 RX ORDER — ESTRADIOL 0.05 MG/D
1 PATCH, EXTENDED RELEASE TRANSDERMAL
Qty: 24 PATCH | Refills: 3 | Status: SHIPPED | OUTPATIENT
Start: 2025-01-13

## 2025-01-10 NOTE — TELEPHONE ENCOUNTER
Pt called regarding their prescription for estradiol (VIVELLE) 0.05 MG/24HR. The rx was sent to pharmacy, but they report that they were unable to pick it up without documentation.    Please call pharmacy to advise:  [facility]/pharmacy #[id number] - [place] - [address] - P [phone] - F [phone]

## 2025-01-10 NOTE — TELEPHONE ENCOUNTER
Medication Refill Request    Daisy Ramirez is requesting a refill of the following medication(s):   Requested Prescriptions     Pending Prescriptions Disp Refills    estradiol (VIVELLE) 0.05 MG/24HR 24 patch 3     Sig: Place 1 patch onto the skin Twice a Week        Listed PCP is Analia Stewart MD   Last provider to prescribe medication: Dr. Stewart on 10/24/24  Date of Last Office Visit at [facility]: 10/2/24 with Dr. Stewart    FUTURE APPOINTMENT:   Future Appointments   Date Time Provider Department Center   2/6/2025 12:00 PM Bry Blanchard MD [facility] MAIN [facility] ECC DEP       Please send refill to:    [facility]/pharmacy #[id number] - [place] - [address] [address] - P [phone] - F [phone] [address] [address] [address]  Phone: [phone] Fax: [phone]      Please review request and approve or deny with recommendations within 48 hours.

## 2025-01-29 ENCOUNTER — OFFICE VISIT (OUTPATIENT)
Age: 65
End: 2025-01-29
Payer: MEDICARE

## 2025-01-29 VITALS
OXYGEN SATURATION: 97 % | HEART RATE: 72 BPM | BODY MASS INDEX: 26.26 KG/M2 | TEMPERATURE: 97.7 F | WEIGHT: 153 LBS | SYSTOLIC BLOOD PRESSURE: 121 MMHG | DIASTOLIC BLOOD PRESSURE: 77 MMHG

## 2025-01-29 DIAGNOSIS — J06.9 VIRAL URI: Primary | ICD-10-CM

## 2025-01-29 LAB
INFLUENZA A ANTIGEN, POC: NEGATIVE
INFLUENZA B ANTIGEN, POC: NEGATIVE
VALID INTERNAL CONTROL, POC: NORMAL

## 2025-01-29 PROCEDURE — 87804 INFLUENZA ASSAY W/OPTIC: CPT

## 2025-01-29 PROCEDURE — 99213 OFFICE O/P EST LOW 20 MIN: CPT

## 2025-01-29 ASSESSMENT — ENCOUNTER SYMPTOMS
SINUS PRESSURE: 1
SINUS PAIN: 0
COUGH: 1
RHINORRHEA: 0
SHORTNESS OF BREATH: 0

## 2025-01-29 NOTE — PROGRESS NOTES
[facility]    Subjective   Daisy Ramirez is a 64 y.o. female who presents for Cough (Body ache, chills, headache, sore throat  X 3 days  - taking Flonase, mucinex)    URI symptoms:  - Symptoms started 4 days ago with a sore throat, body aches   - Progressed to include subjective fevers/chills, coughing/thick mucus (yellow), minimal sinus pressure alleviated with nasal spray  - Tried tea/honey, Tylenol, cough drops  - Got back from [place] last night, week long trip     Review of Systems   Review of Systems   Constitutional:  Positive for chills. Negative for fever.   HENT:  Positive for sinus pressure. Negative for rhinorrhea and sinus pain.    Respiratory:  Positive for cough. Negative for shortness of breath.    Cardiovascular:  Negative for chest pain.   Musculoskeletal:  Positive for myalgias.        Medical History  Past Medical History:   Diagnosis Date    Anxiety     Arthritis     FINGERS, AND WRIST    History of blood transfusion     40 YEARS AGO    Stress      Medications  Current Outpatient Medications   Medication Sig    estradiol (VIVELLE) 0.05 MG/24HR Place 1 patch onto the skin Twice a Week    venlafaxine (EFFEXOR XR) 150 MG extended release capsule Take 1 capsule by mouth daily    MAGNESIUM PO Take 1 tablet by mouth daily    Biotin 5 MG CAPS Take 1 capsule by mouth daily    calcium carbonate 648 MG TABS Take 1 tablet by mouth daily    Multiple Vitamin (QUINTABS) TABS Take 1 tablet by mouth    Cholecalciferol (VITAMIN D3) 1.25 MG (17015 UT) CAPS Take by mouth     No current facility-administered medications for this visit.       Immunizations   Immunization History   Administered Date(s) Administered    COVID-19, PFIZER Bivalent, DO NOT Dilute, (age 12y+), IM, 30 mcg/0.3 mL 12/27/2022    COVID-19, PFIZER GRAY top, DO NOT Dilute, (age 12 y+), IM, 30 mcg/0.3 mL 05/19/2022    COVID-19, PFIZER PURPLE top, DILUTE for use, (age 12 y+), 30mcg/0.3mL

## 2025-01-29 NOTE — PROGRESS NOTES
Identified pt with two pt identifiers(name and ). Reviewed record in preparation for visit and have obtained necessary documentation.  Chief Complaint   Patient presents with    Cough     Body ache, chills, headache, sore throat  X 3 days  - taking Flonase, mucinex        Vitals:    25 1056   BP: 121/77   Pulse: 72   Temp: 97.7 °F (36.5 °C)   TempSrc: Oral   SpO2: 97%   Weight: 69.4 kg (153 lb)         Coordination of Care Questionnaire:  :     \"Have you been to the ER, urgent care clinic since your last visit?  Hospitalized since your last visit?\"    NO    “Have you seen or consulted any other health care providers outside of [facility] since your last visit?”    NO        “Have you had a colorectal cancer screening such as a colonoscopy/FIT/Cologuard?        Date of last Colonoscopy: 2020  No cologuard on file  No FIT/FOBT on file   No flexible sigmoidoscopy on file         Click Here for Release of Records Request

## 2025-01-30 NOTE — PROGRESS NOTES
[facility] Attending Attestation: While the patient was in clinic or immediately following the patient leaving the clinic, I reviewed the patient's medical history, the resident's findings on physical examination, and the patient's diagnosis and treatment plan with the resident and agree with the documentation in the note.     Kun Brown MD

## 2025-06-19 ENCOUNTER — HOSPITAL ENCOUNTER (OUTPATIENT)
Facility: HOSPITAL | Age: 65
Discharge: HOME | End: 2025-06-22
Attending: ORTHOPAEDIC SURGERY
Payer: MEDICARE

## 2025-06-19 DIAGNOSIS — M77.11 LATERAL EPICONDYLITIS OF RIGHT ELBOW: ICD-10-CM

## 2025-06-19 PROCEDURE — 73221 MRI JOINT UPR EXTREM W/O DYE: CPT

## 2025-07-23 ENCOUNTER — OFFICE VISIT (OUTPATIENT)
Age: 65
End: 2025-07-23
Payer: MEDICARE

## 2025-07-23 VITALS
TEMPERATURE: 98.7 F | RESPIRATION RATE: 16 BRPM | BODY MASS INDEX: 23.87 KG/M2 | OXYGEN SATURATION: 97 % | HEART RATE: 70 BPM | SYSTOLIC BLOOD PRESSURE: 111 MMHG | DIASTOLIC BLOOD PRESSURE: 72 MMHG | WEIGHT: 139.8 LBS | HEIGHT: 64 IN

## 2025-07-23 DIAGNOSIS — M65.4 DE QUERVAIN'S TENOSYNOVITIS, RIGHT: ICD-10-CM

## 2025-07-23 DIAGNOSIS — G56.01 RIGHT CARPAL TUNNEL SYNDROME: ICD-10-CM

## 2025-07-23 DIAGNOSIS — Z01.818 PRE-OP EXAM: ICD-10-CM

## 2025-07-23 DIAGNOSIS — M77.11 LATERAL EPICONDYLITIS OF RIGHT ELBOW: Primary | ICD-10-CM

## 2025-07-23 PROCEDURE — G8427 DOCREV CUR MEDS BY ELIG CLIN: HCPCS | Performed by: FAMILY MEDICINE

## 2025-07-23 PROCEDURE — 1090F PRES/ABSN URINE INCON ASSESS: CPT | Performed by: FAMILY MEDICINE

## 2025-07-23 PROCEDURE — 4004F PT TOBACCO SCREEN RCVD TLK: CPT | Performed by: FAMILY MEDICINE

## 2025-07-23 PROCEDURE — G8420 CALC BMI NORM PARAMETERS: HCPCS | Performed by: FAMILY MEDICINE

## 2025-07-23 PROCEDURE — 1123F ACP DISCUSS/DSCN MKR DOCD: CPT | Performed by: FAMILY MEDICINE

## 2025-07-23 PROCEDURE — 1126F AMNT PAIN NOTED NONE PRSNT: CPT | Performed by: FAMILY MEDICINE

## 2025-07-23 PROCEDURE — 1159F MED LIST DOCD IN RCRD: CPT | Performed by: FAMILY MEDICINE

## 2025-07-23 PROCEDURE — 99213 OFFICE O/P EST LOW 20 MIN: CPT

## 2025-07-23 PROCEDURE — 93010 ELECTROCARDIOGRAM REPORT: CPT

## 2025-07-23 PROCEDURE — G8399 PT W/DXA RESULTS DOCUMENT: HCPCS | Performed by: FAMILY MEDICINE

## 2025-07-23 PROCEDURE — 3017F COLORECTAL CA SCREEN DOC REV: CPT | Performed by: FAMILY MEDICINE

## 2025-07-23 PROCEDURE — 93005 ELECTROCARDIOGRAM TRACING: CPT

## 2025-07-23 RX ORDER — CHLORHEXIDINE GLUCONATE ORAL RINSE 1.2 MG/ML
SOLUTION DENTAL
COMMUNITY
Start: 2025-05-15

## 2025-07-23 RX ORDER — LISDEXAMFETAMINE DIMESYLATE 40 MG/1
1 CAPSULE ORAL EVERY MORNING
COMMUNITY
Start: 2025-07-09

## 2025-07-23 RX ORDER — TRAMADOL HYDROCHLORIDE 50 MG/1
TABLET ORAL
COMMUNITY
Start: 2025-07-09

## 2025-07-23 SDOH — ECONOMIC STABILITY: FOOD INSECURITY: WITHIN THE PAST 12 MONTHS, YOU WORRIED THAT YOUR FOOD WOULD RUN OUT BEFORE YOU GOT MONEY TO BUY MORE.: PATIENT DECLINED

## 2025-07-23 SDOH — ECONOMIC STABILITY: FOOD INSECURITY: WITHIN THE PAST 12 MONTHS, THE FOOD YOU BOUGHT JUST DIDN'T LAST AND YOU DIDN'T HAVE MONEY TO GET MORE.: PATIENT DECLINED

## 2025-07-23 ASSESSMENT — PATIENT HEALTH QUESTIONNAIRE - PHQ9
SUM OF ALL RESPONSES TO PHQ QUESTIONS 1-9: 13
SUM OF ALL RESPONSES TO PHQ QUESTIONS 1-9: 13
4. FEELING TIRED OR HAVING LITTLE ENERGY: MORE THAN HALF THE DAYS
2. FEELING DOWN, DEPRESSED OR HOPELESS: MORE THAN HALF THE DAYS
3. TROUBLE FALLING OR STAYING ASLEEP: SEVERAL DAYS
8. MOVING OR SPEAKING SO SLOWLY THAT OTHER PEOPLE COULD HAVE NOTICED. OR THE OPPOSITE, BEING SO FIGETY OR RESTLESS THAT YOU HAVE BEEN MOVING AROUND A LOT MORE THAN USUAL: SEVERAL DAYS
5. POOR APPETITE OR OVEREATING: MORE THAN HALF THE DAYS
6. FEELING BAD ABOUT YOURSELF - OR THAT YOU ARE A FAILURE OR HAVE LET YOURSELF OR YOUR FAMILY DOWN: SEVERAL DAYS
9. THOUGHTS THAT YOU WOULD BE BETTER OFF DEAD, OR OF HURTING YOURSELF: NOT AT ALL
1. LITTLE INTEREST OR PLEASURE IN DOING THINGS: MORE THAN HALF THE DAYS
SUM OF ALL RESPONSES TO PHQ QUESTIONS 1-9: 13
SUM OF ALL RESPONSES TO PHQ QUESTIONS 1-9: 13
7. TROUBLE CONCENTRATING ON THINGS, SUCH AS READING THE NEWSPAPER OR WATCHING TELEVISION: MORE THAN HALF THE DAYS
10. IF YOU CHECKED OFF ANY PROBLEMS, HOW DIFFICULT HAVE THESE PROBLEMS MADE IT FOR YOU TO DO YOUR WORK, TAKE CARE OF THINGS AT HOME, OR GET ALONG WITH OTHER PEOPLE: NOT DIFFICULT AT ALL

## 2025-07-23 NOTE — PROGRESS NOTES
[address] [id number]   Office [phone], Fax [phone]    Preoperative Evaluation for Daisy Ramirez     7/23/2025  Chief Complaint   Patient presents with    Pre-op Exam     Surgery 08.08.25       HPI:   Daisy Ramirez is a 65 y.o. female referred for evaluation by: Dr. Lion (ortho) for Pre- Op Evaluation.     Type of surgery and indication: right thumb carpometacarpal joint arthroplasty, right wrist de Quervain's release, a right wrist endoscopic carpal tunnel release and a right elbow lateral epicondylar release with fascial repair   Surgery site : R thumb, wrist and elbow  Anesthesia type: Local  Date of procedure: 8/8/2025    Review of Systems   Review of Systems:   CONSTITUTIONAL: No fevers, chills, weight changes  EYES: No visual changes  ENT: No sinus congestion, rhinorrhea, cough.   CARDIOVASCULAR: No chest pain. palpitations  RESPIRATORY: No dyspnea, cough, wheeze  GI: No abdominal pain, hematochezia, melena  : No dysuria, urgency, burning  NEURO: No numbness, tingling, weakness  MUSCULOSKELETAL: No arthralgias, myalgias, edema  SKIN: No rash  PSYCH: No mood changes, SI/HI    Inherent Risk of Surgery   Surgical risk:  Intermediate    Patient Cardiac Risk Assessment   Risk Assessment using Revised Hutson cardiac risk index (RCRI): score 0,  Risk of major cardiac event 0.5 %    Assessment of Cardiac Functional Status   [facility] Activity Status Index: 58.2 points. 9.9 METs.    Can perform 4 Mets? Yes.    Other Risk Factors    Screening for ETOH use: socially  Smoking status:  current smoker; 3-4 cigs daily. Previously quit for 4 years, restarted after  passed away 1 year ago.    Personal or FH of bleeding problems:  no  Personal or FH of blood clots: no  Personal or FH of anesthesia problems:  no; has had spine surgery in the past    Pulmonary Risk:  Asthma or COPD: no  Obesity:  Body mass index is 24 kg/m².  Surgery close to diaphragm:  no  Known ANABEL:  no  Albumin normal,

## 2025-07-23 NOTE — PROGRESS NOTES
Daisy Ramirez is a 65 y.o. female      Chief Complaint   Patient presents with    Pre-op Exam     Surgery 08.08.25       \"Have you been to the ER, urgent care clinic since your last visit?  Hospitalized since your last visit?\"    NO    “Have you seen or consulted any other health care providers outside of [facility] since your last visit?”    NO    “Have you had a colorectal cancer screening such as a colonoscopy/FIT/Cologuard?    YES - Type: Colonoscopy - Where: June 2025       Date of last Colonoscopy: 1/7/2020  No cologuard on file  No FIT/FOBT on file   No flexible sigmoidoscopy on file               Vitals:    07/23/25 1103   BP: 111/72   BP Site: Right Upper Arm   Patient Position: Sitting   BP Cuff Size: Medium Adult   Pulse: 70   Resp: 16   Temp: 98.7 °F (37.1 °C)   TempSrc: Temporal   SpO2: 97%   Weight: 63.4 kg (139 lb 12.8 oz)   Height: 1.626 m (5' 4\")            Health Maintenance Due   Topic Date Due    DTaP/Tdap/Td vaccine (1 - Tdap) Never done    Pneumococcal 50+ years Vaccine (1 of 1 - PCV) Never done    COVID-19 Vaccine (6 - 2024-25 season) 09/01/2024    Colorectal Cancer Screen  01/07/2025         Medication Reconciliation completed, changes noted.  Please  Update medication list.

## (undated) DEVICE — SOLUTION IV 250ML 0.9% SOD CHL CLR INJ FLX BG CONT PRT CLSR

## (undated) DEVICE — POSITIONER HD REST FOAM CMFRT TCH

## (undated) DEVICE — LAMINECTOMY-SMH: Brand: MEDLINE INDUSTRIES, INC.

## (undated) DEVICE — SPONGE GZ W4XL4IN COT RADPQ HIGHLY ABSRB

## (undated) DEVICE — SPONGE GZ W4XL4IN COT 12 PLY TYP VII WVN C FLD DSGN STERILE

## (undated) DEVICE — GLOVE SURG SZ 65 L12IN FNGR THK94MIL STD WHT LTX FREE

## (undated) DEVICE — INTENT OT USE PROVIDES A STERILE INTERFACE BETWEEN THE OPERATING ROOM SURGICAL LAMPS (NON-STERILE) AND THE SURGEON OR STAFF WORKING IN THE STERILE FIELD.: Brand: ASPEN® ALC PLUS LIGHT HANDLE COVER

## (undated) DEVICE — SUTURE VCRL SZ 2-0 L18IN ABSRB UD L26MM CP-2 1/2 CIR REV J762D

## (undated) DEVICE — KIT ARMOR C DRP COLLAPSIBLE AND SELF EXP TOP CVR FOR FLUOROSCOPIC

## (undated) DEVICE — COVER,TABLE,HEAVY DUTY,60"X90",STRL: Brand: MEDLINE

## (undated) DEVICE — 1LYRTR 16FR10ML100%SIL UMS SNP: Brand: MEDLINE INDUSTRIES, INC.

## (undated) DEVICE — TOOL 14MH30 LEGEND 14CM 3MM: Brand: MIDAS REX ™

## (undated) DEVICE — DRESSING FOAM POST OPERATIVE 4X10 IN MEPILEX BORDER AG

## (undated) DEVICE — CANNULA INJ L2.5IN BLNT TIP 3MM CLR BODY W/ 1 W VLV DLP

## (undated) DEVICE — THE MILL DISPOSABLE - MEDIUM

## (undated) DEVICE — BLADE,CARBON-STEEL,11,STRL,DISPOSABLE,TB: Brand: MEDLINE

## (undated) DEVICE — SUTURE VCRL SZ 0 L18IN ABSRB VLT L36MM CT-1 1/2 CIR J740D

## (undated) DEVICE — SUTURE MCRYL SZ 4-0 L27IN ABSRB UD L19MM PS-2 1/2 CIR PRIM Y426H

## (undated) DEVICE — BONE WAX WHITE: Brand: BONE WAX WHITE

## (undated) DEVICE — FLOSEAL WITH RECOTHROM - 10ML.: Brand: FLOSEAL HEMOSTATIC MATRIX

## (undated) DEVICE — BIPOLAR IRRIGATOR INTEGRATED TUBING AND BIPOLAR CORD SET, DISPOSABLE

## (undated) DEVICE — KIT EVAC 400CC DIA1/8IN H PAT 12.5IN 3 SPR RND SHP PVC DRN

## (undated) DEVICE — SUTURE NONABSORBABLE MONOFILAMENT 3-0 PS-1 18 IN BLK ETHILON 1663H

## (undated) DEVICE — ASTOUND STANDARD SURGICAL GOWN, XXL: Brand: CONVERTORS